# Patient Record
Sex: FEMALE | Race: BLACK OR AFRICAN AMERICAN | Employment: FULL TIME | URBAN - METROPOLITAN AREA
[De-identification: names, ages, dates, MRNs, and addresses within clinical notes are randomized per-mention and may not be internally consistent; named-entity substitution may affect disease eponyms.]

---

## 2017-07-01 ENCOUNTER — HOSPITAL ENCOUNTER (EMERGENCY)
Facility: CLINIC | Age: 22
Discharge: HOME | End: 2017-07-01
Attending: EMERGENCY MEDICINE | Admitting: EMERGENCY MEDICINE

## 2017-07-01 VITALS
WEIGHT: 170.19 LBS | RESPIRATION RATE: 16 BRPM | BODY MASS INDEX: 23.05 KG/M2 | DIASTOLIC BLOOD PRESSURE: 70 MMHG | HEIGHT: 72 IN | OXYGEN SATURATION: 100 % | TEMPERATURE: 98 F | SYSTOLIC BLOOD PRESSURE: 140 MMHG

## 2017-07-01 DIAGNOSIS — H53.9 VISION CHANGES: ICD-10-CM

## 2017-07-01 PROCEDURE — 99282 EMERGENCY DEPT VISIT SF MDM: CPT | Performed by: EMERGENCY MEDICINE

## 2017-07-01 PROCEDURE — 99282 EMERGENCY DEPT VISIT SF MDM: CPT | Mod: Z6 | Performed by: EMERGENCY MEDICINE

## 2017-07-01 RX ORDER — TETRACAINE HYDROCHLORIDE 5 MG/ML
SOLUTION OPHTHALMIC
Status: DISCONTINUED
Start: 2017-07-01 | End: 2017-07-02 | Stop reason: HOSPADM

## 2017-07-01 ASSESSMENT — ENCOUNTER SYMPTOMS
EYE PAIN: 0
EYE ITCHING: 1
PHOTOPHOBIA: 1
HEADACHES: 1

## 2017-07-01 ASSESSMENT — VISUAL ACUITY
OD: 20/15
OS: LESS THAN 20/400

## 2017-07-01 NOTE — ED AVS SNAPSHOT
[facility], [place], Emergency Department    [address] [address]    Phone:  [phone]                                       Paul Loera   MRN: [id number]    Department:  [facility], Emergency Department   Date of Visit:  7/1/2017           After Visit Summary Signature Page     I have received my discharge instructions, and my questions have been answered. I have discussed any challenges I see with this plan with the nurse or doctor.    ..........................................................................................................................................  Patient/Patient Representative Signature      ..........................................................................................................................................  Patient Representative Print Name and Relationship to Patient    ..................................................               ................................................  Date                                            Time    ..........................................................................................................................................  Reviewed by Signature/Title    ...................................................              ..............................................  Date                                                            Time

## 2017-07-01 NOTE — ED AVS SNAPSHOT
[facility] [place], Emergency Department    [address] [address]    Phone:  [phone]                                       Paul Loera   MRN: [id number]    Department:  [facility], Emergency Department   Date of Visit:  7/1/2017           Patient Information     Date Of Birth          1995        Your diagnoses for this visit were:     Vision changes        You were seen by James Capone MD.        Discharge Instructions       Please make an appointment to follow up with Eye Clinic (phone: [phone]) as soon as possible.      24 Hour Appointment Hotline       To make an appointment at any [place] clinic, call [phone]). If you don't have a family doctor or clinic, we will help you find one. [place] clinics are conveniently located to serve the needs of you and your family.             Review of your medicines      Our records show that you are taking the medicines listed below. If these are incorrect, please call your family doctor or clinic.        Dose / Directions Last dose taken    acetaminophen 325 MG tablet   Commonly known as:  TYLENOL   Dose:  650 mg   Quantity:  100 tablet        Take 2 tablets (650 mg) by mouth every 4 hours as needed for other (mild pain)   Refills:  0                Orders Needing Specimen Collection     None      Pending Results     No orders found from 6/29/2017 to 7/2/2017.            Pending Culture Results     No orders found from 6/29/2017 to 7/2/2017.            Pending Results Instructions     If you had any lab results that were not finalized at the time of your Discharge, you can call the ED Lab Result RN at [phone]. You will be contacted by this team for any positive Lab results or changes in treatment. The nurses are available 7 days a week from 10A to 6:30P.  You can leave a message 24 hours per day and they will return your call.        Thank you for choosing [facility]       Thank you for choosing  [facility] for your care. Our goal is always to provide you with excellent care. Hearing back from our patients is one way we can continue to improve our services. Please take a few minutes to complete the written survey that you may receive in the mail after you visit with us. Thank you!        [employer] Information     [employer] gives you secure access to your electronic health record. If you see a primary care provider, you can also send messages to your care team and make appointments. If you have questions, please call your primary care clinic.  If you do not have a primary care provider, please call [phone] and they will assist you.        Care EveryWhere ID     This is your Care EveryWhere ID. This could be used by other organizations to access your [facility] medical records  [id number]        Equal Access to Services     DUANE DE LA ROSA : Verona Lozano, tc elkins, brandy hinojosa, bartolome john. So [facility] [phone].    ATENCIÓN: Si habla español, tiene a yanez disposición servicios gratuitos de asistencia lingüística. Llame al [phone].    We comply with applicable federal civil rights laws and [place] laws. We do not discriminate on the basis of race, color, national origin, age, disability sex, sexual orientation or gender identity.            After Visit Summary       This is your record. Keep this with you and show to your community pharmacist(s) and doctor(s) at your next visit.

## 2017-07-02 ENCOUNTER — NURSE TRIAGE (OUTPATIENT)
Dept: NURSING | Facility: CLINIC | Age: 22
End: 2017-07-02
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
## 2017-07-02 NOTE — TELEPHONE ENCOUNTER
Reason for Call: Patient was seen in ER yesterday for Change in vision and is requesting an appointment with an eye specialist today. States vision is worse today. Declined triage. Patient will go to an ER, but not certain which one at this time.       Patient Recommendations/Teaching:Call back if any further questions or concerns.     Routed to:Not routed.     Jacki Gutiérrez RN [facility]
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
## 2017-07-02 NOTE — DISCHARGE INSTRUCTIONS
Please make an appointment to follow up with Eye Clinic (phone: [phone]) as soon as possible.

## 2017-07-14 ENCOUNTER — ANESTHESIA EVENT (OUTPATIENT)
Dept: SURGERY | Facility: CLINIC | Age: 22
End: 2017-07-14

## 2017-07-17 ENCOUNTER — OFFICE VISIT (OUTPATIENT)
Dept: SURGERY | Facility: CLINIC | Age: 22
End: 2017-07-17

## 2017-07-17 ENCOUNTER — APPOINTMENT (OUTPATIENT)
Dept: SURGERY | Facility: CLINIC | Age: 22
End: 2017-07-17

## 2017-07-17 VITALS
BODY MASS INDEX: 23.45 KG/M2 | HEIGHT: 72 IN | WEIGHT: 173.1 LBS | TEMPERATURE: 98.1 F | DIASTOLIC BLOOD PRESSURE: 78 MMHG | OXYGEN SATURATION: 99 % | SYSTOLIC BLOOD PRESSURE: 121 MMHG | RESPIRATION RATE: 14 BRPM | HEART RATE: 63 BPM

## 2017-07-17 DIAGNOSIS — Z01.818 PRE-OP EVALUATION: Primary | ICD-10-CM

## 2017-07-17 RX ORDER — OXYCODONE HYDROCHLORIDE 5 MG/1
5 TABLET ORAL 4 TIMES DAILY PRN
COMMUNITY
Start: 2017-07-02 | End: 2018-06-19

## 2017-07-17 RX ORDER — ALBUTEROL SULFATE 90 UG/1
2 AEROSOL, METERED RESPIRATORY (INHALATION) PRN
COMMUNITY
Start: 2016-03-15 | End: 2018-09-24
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
## 2017-07-17 NOTE — ANESTHESIA PREPROCEDURE EVALUATION
PAC Discussion and Assessment    ASA Classification: 1  Case is suitable for:   Anesthetic techniques and relevant risks discussed: GA  Invasive monitoring and risk discussed: No  Types:   Possibility and Risk of blood transfusion discussed: No  NPO instructions given:   Additional anesthetic preparation and risks discussed:   Needs early admission to pre-op area:   Other:     PAC Resident/NP Anesthesia Assessment:        Mid-Level Provider/Resident:   Date:   Time:     Attending Anesthesiologist Anesthesia Assessment:        Anesthesiologist:   Date:   Time:   Pass/Fail:   Disposition:     PAC Pharmacist Assessment:        Pharmacist:   Date:   Time:                           .

## 2017-12-31 ENCOUNTER — HEALTH MAINTENANCE LETTER (OUTPATIENT)
Age: 22
End: 2017-12-31

## 2018-02-19 ENCOUNTER — TRANSFERRED RECORDS (OUTPATIENT)
Dept: HEALTH INFORMATION MANAGEMENT | Facility: CLINIC | Age: 23
End: 2018-02-19

## 2018-06-11 ENCOUNTER — TRANSFERRED RECORDS (OUTPATIENT)
Dept: HEALTH INFORMATION MANAGEMENT | Facility: CLINIC | Age: 23
End: 2018-06-11

## 2018-06-19 ENCOUNTER — OFFICE VISIT (OUTPATIENT)
Dept: SURGERY | Facility: CLINIC | Age: 23
End: 2018-06-19
Payer: COMMERCIAL

## 2018-06-19 ENCOUNTER — ANESTHESIA EVENT (OUTPATIENT)
Dept: SURGERY | Facility: CLINIC | Age: 23
End: 2018-06-19

## 2018-06-19 ENCOUNTER — APPOINTMENT (OUTPATIENT)
Dept: SURGERY | Facility: CLINIC | Age: 23
End: 2018-06-19
Payer: COMMERCIAL

## 2018-06-19 VITALS
HEIGHT: 72 IN | WEIGHT: 165.2 LBS | SYSTOLIC BLOOD PRESSURE: 112 MMHG | OXYGEN SATURATION: 100 % | RESPIRATION RATE: 12 BRPM | HEART RATE: 64 BPM | TEMPERATURE: 98.3 F | DIASTOLIC BLOOD PRESSURE: 71 MMHG | BODY MASS INDEX: 22.37 KG/M2

## 2018-06-19 DIAGNOSIS — Z01.818 PRE-OPERATIVE GENERAL PHYSICAL EXAMINATION: Primary | ICD-10-CM
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
## 2018-09-14 ENCOUNTER — TELEPHONE (OUTPATIENT)
Dept: ORTHOPEDICS | Facility: CLINIC | Age: 23
End: 2018-09-14

## 2018-09-14 ENCOUNTER — OFFICE VISIT (OUTPATIENT)
Dept: ORTHOPEDICS | Facility: CLINIC | Age: 23
End: 2018-09-14
Payer: COMMERCIAL

## 2018-09-14 VITALS — WEIGHT: 159 LBS | HEIGHT: 72 IN | BODY MASS INDEX: 21.54 KG/M2

## 2018-09-14 DIAGNOSIS — L03.114 CELLULITIS OF LEFT UPPER EXTREMITY: Primary | ICD-10-CM

## 2018-09-14 DIAGNOSIS — D48.19 GIANT CELL TUMOR OF TENDON SHEATH: ICD-10-CM

## 2018-09-14 RX ORDER — KETOROLAC TROMETHAMINE 5 MG/ML
1 SOLUTION OPHTHALMIC 4 TIMES DAILY
COMMUNITY
End: 2018-09-24

## 2018-09-14 RX ORDER — PREDNISOLONE ACETATE 10 MG/ML
1-2 SUSPENSION/ DROPS OPHTHALMIC 4 TIMES DAILY
COMMUNITY
End: 2018-09-24

## 2018-09-14 ASSESSMENT — ENCOUNTER SYMPTOMS
HYPERTENSION: 0
EYE PAIN: 1
PALPITATIONS: 0
BRUISES/BLEEDS EASILY: 0
LIGHT-HEADEDNESS: 0
LEG PAIN: 1
SYNCOPE: 0
EXERCISE INTOLERANCE: 0
SLEEP DISTURBANCES DUE TO BREATHING: 0
HYPOTENSION: 0
ORTHOPNEA: 0
SWOLLEN GLANDS: 0

## 2018-09-14 NOTE — MR AVS SNAPSHOT
After Visit Summary   9/14/2018    Paul Loera    MRN: [id number]           Patient Information     Date Of Birth          1995        Visit Information        Provider Department      9/14/2018 11:15 AM Pankaj Guevara [facility]        Today's Diagnoses     Cellulitis of left upper extremity    -  1    Giant cell tumor of tendon sheath           Follow-ups after your visit        Future tests that were ordered for you today     Open Future Orders        Priority Expected Expires Ordered    MR Hand Left w/o & w Contrast Routine  9/14/2019 9/14/2018            Who to contact     Please call your clinic at [phone] to:    Ask questions about your health    Make or cancel appointments    Discuss your medicines    Learn about your test results    Speak to your doctor            Additional Information About Your Visit        [facility] Information     [employer] gives you secure access to your electronic health record. If you see a primary care provider, you can also send messages to your care team and make appointments. If you have questions, please call your primary care clinic.  If you do not have a primary care provider, please call [phone] and they will assist you.      [employer] is an electronic gateway that provides easy, online access to your medical records. With [employer], you can request a clinic appointment, read your test results, renew a prescription or communicate with your care team.     To access your existing account, please contact your [facility] or call [phone] for assistance.        Care EveryWhere ID     This is your Care EveryWhere ID. This could be used by other organizations to access your [place] medical records  [id number]        Your Vitals Were     Height BMI (Body Mass Index)                1.829 m (6') 21.56 kg/m2           Blood Pressure from Last 3 Encounters:   06/19/18 112/71   07/17/17 121/78    07/01/17 140/70    Weight from Last 3 Encounters:   09/14/18 72.1 kg (159 lb)   06/19/18 74.9 kg (165 lb 3.2 oz)   07/17/17 78.5 kg (173 lb 1.6 oz)               Primary Care Provider Fax #    Physician No Ref-Primary [phone]       No address on file        Equal Access to Services     [facility] : Hadii aad ku hadasho Soomaali, waaxda luqadaha, qaybta kaalmada adeegyada, waxay idiin hayaan adelatonya burger laeverette . So [facility] [phone].    ATENCIÓN: Si habla español, tiene a yanez disposición servicios gratuitos de asistencia lingüística. Llame al [phone].    We comply with applicable federal civil rights laws and [place] laws. We do not discriminate on the basis of race, color, national origin, age, disability, sex, sexual orientation, or gender identity.            Thank you!     Thank you for [facility]  for your care. Our goal is always to provide you with excellent care. Hearing back from our patients is one way we can continue to improve our services. Please take a few minutes to complete the written survey that you may receive in the mail after your visit with us. Thank you!             Your Updated Medication List - Protect others around you: Learn how to safely use, store and throw away your medicines at www.disposemymeds.org.          This list is accurate as of 9/14/18 11:31 AM.  Always use your most recent med list.                   Brand Name Dispense Instructions for use Diagnosis    albuterol 108 (90 Base) MCG/ACT inhaler    PROAIR HFA/PROVENTIL HFA/VENTOLIN HFA     Inhale 2 puffs into the lungs as needed (exercise)        CEPHALEXIN PO      Take 500 mg by mouth        ketorolac 0.5 % ophthalmic solution    ACULAR     1 drop 4 times daily        prednisoLONE acetate 1 % ophthalmic susp    PRED FORTE     1-2 drops 4 times daily

## 2018-09-14 NOTE — TELEPHONE ENCOUNTER
RN called patient's insurance [facility].  RN spoke with Davie YANG And Case # [id number], no  Prior authorization required for this imaging.

## 2018-09-14 NOTE — LETTER
9/14/2018       RE: Paul Loera  [address]  [place] [facility] [address]     Dear Colleague,    Thank you for referring your patient, Paul Loera, [facility]. Please see a copy of my visit note below.    I was present with the PA during the history and exam.  I discussed the case with the PA and agree with the findings as documented in the assessment and plan.    Chief Complaint: left hand swelling and pain    HPI: Paul is a 23-year-old female who is here for follow-up of her left wrist.  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 Guevara.  She has done well with it over the last 2 years.  However, couple months ago, she noticed 2 bumps in the area of the incision that were getting larger.  They were becoming somewhat painful.  She is concerned about a recurrence.  Then, 8 days ago, she noticed increased swelling and pain in that area as well as swelling and redness into the hand.  She denies any trauma or injury.  She denies any wound or illness.  She denies any fever or chills.  She was seen in the emergency department 4 days ago and x-rays were taken, which were negative.  She was placed on Keflex 500 mg 4 times a day.  She has been taking those.  She notes minimal improvement in her pain and swelling, but some improvement in the redness.  She is also having numbness and tingling in the area of her left thumb and first finger.  He has some pain and weakness with extension of her thumb.  She otherwise is feeling fine with no other stiff, sore or swollen joints.  She is unsure what to take for pain.  She had recent eye surgery in July and is not sure if she is allowed to take ibuprofen or aspirin products.  She will be seeing them later today to let them know what is going on with her hand and see what she can take.    Physical Exam: Paul is an athletic 23-year-old  female who is alert and oriented and in mild distress.  The left hand is obviously swollen over the dorsum and into the fingers.  She has swelling and tenderness diffusely throughout as well as at the site of the previous incision on the radial side of the left wrist.  There is minimal erythema today.  There is +1 edema.  She is describing some decreased sensation to the left thumb and index finger.  She is able to actively flex and extend, but it is limited by pain and swelling.  There are 2 small palpable nodules at either end of her incision.  There are no skin lesions or wounds.  No sign of abiodun abscess.    Imaging: X-rays taken of the left wrist 4 days ago in the emergency department were normal.    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  Will help to schedule that within the next day or 2 and then call the patient with the results and plan of care.  In the meantime, she should use ice, elevation, and I gave her an Ace wrap for compression.  She should check with her eye doctor and see if she can take ibuprofen to help with some of the pain and swelling.  She should finish out all of her antibiotics.  We will talk to her soon with the plan.  All questions answered.  Patient was also examined by Dr. Guevara and he agrees with the plan of care.           Again, thank you for allowing me to participate in the care of your patient.      Sincerely,    Pankaj Guevaar MD

## 2018-09-14 NOTE — PROGRESS NOTES
Chief Complaint: left hand swelling and pain    HPI: Paul is a 23-year-old female who is here for follow-up of her left wrist.  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 Guevara.  She has done well with it over the last 2 years.  However, couple months ago, she noticed 2 bumps in the area of the incision that were getting larger.  They were becoming somewhat painful.  She is concerned about a recurrence.  Then, 8 days ago, she noticed increased swelling and pain in that area as well as swelling and redness into the hand.  She denies any trauma or injury.  She denies any wound or illness.  She denies any fever or chills.  She was seen in the emergency department 4 days ago and x-rays were taken, which were negative.  She was placed on Keflex 500 mg 4 times a day.  She has been taking those.  She notes minimal improvement in her pain and swelling, but some improvement in the redness.  She is also having numbness and tingling in the area of her left thumb and first finger.  He has some pain and weakness with extension of her thumb.  She otherwise is feeling fine with no other stiff, sore or swollen joints.  She is unsure what to take for pain.  She had recent eye surgery in July and is not sure if she is allowed to take ibuprofen or aspirin products.  She will be seeing them later today to let them know what is going on with her hand and see what she can take.    Physical Exam: Paul is an athletic 23-year-old female who is alert and oriented and in mild distress.  The left hand is obviously swollen over the dorsum and into the fingers.  She has swelling and tenderness diffusely throughout as well as at the site of the previous incision on the radial side of the left wrist.  There is minimal erythema today.  There is +1 edema.  She is describing some decreased sensation to the left thumb and index finger.  She is able to actively flex and extend, but it is  limited by pain and swelling.  There are 2 small palpable nodules at either end of her incision.  There are no skin lesions or wounds.  No sign of abiodun abscess.    Imaging: X-rays taken of the left wrist 4 days ago in the emergency department were normal.    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  Will help to schedule that within the next day or 2 and then call the patient with the results and plan of care.  In the meantime, she should use ice, elevation, and I gave her an Ace wrap for compression.  She should check with her eye doctor and see if she can take ibuprofen to help with some of the pain and swelling.  She should finish out all of her antibiotics.  We will talk to her soon with the plan.  All questions answered.  Patient was also examined by Dr. Guevara and he agrees with the plan of care.

## 2018-09-14 NOTE — NURSING NOTE
Reason For Visit:   Chief Complaint   Patient presents with     Left Wrist - Mass     New mass on left wrist that was noticed 8 days ago       Ht 1.829 m (6')  Wt 72.1 kg (159 lb)  BMI 21.56 kg/m2    Pain Assessment  Patient Currently in Pain: Yes  0-10 Pain Scale: 8  Primary Pain Location: Wrist  Pain Orientation: Left  Pain Descriptors: Pressure  Alleviating Factors: Rest  Aggravating Factors: Movement ()    DOS 10/27/16 left wrist mass excision    Deon Clark, ATC

## 2018-09-17 ENCOUNTER — RADIANT APPOINTMENT (OUTPATIENT)
Dept: MRI IMAGING | Facility: CLINIC | Age: 23
End: 2018-09-17
Attending: PHYSICIAN ASSISTANT
Payer: COMMERCIAL

## 2018-09-17 DIAGNOSIS — D48.19 GIANT CELL TUMOR OF TENDON SHEATH: ICD-10-CM

## 2018-09-17 DIAGNOSIS — L03.114 CELLULITIS OF LEFT UPPER EXTREMITY: ICD-10-CM

## 2018-09-17 RX ORDER — GADOBUTROL 604.72 MG/ML
7.5 INJECTION INTRAVENOUS ONCE
Status: COMPLETED | OUTPATIENT
Start: 2018-09-17 | End: 2018-09-17

## 2018-09-17 RX ADMIN — GADOBUTROL 7.5 ML: 604.72 INJECTION INTRAVENOUS at 11:56

## 2018-09-19 ENCOUNTER — TELEPHONE (OUTPATIENT)
Dept: ORTHOPEDICS | Facility: CLINIC | Age: 23
End: 2018-09-19

## 2018-09-19 NOTE — TELEPHONE ENCOUNTER
[facility] Call Center    Phone Message    May a detailed message be left on voicemail: yes    Reason for Call: Other: pt calling to get some information on the surgery, recovery, when he can go back to work. Pt also wants to know about the diagnosis about the surgery. Pt would like to know what is going on. Please call pt back to discuss.     Action Taken: Message routed to:  [facility] orthopedics

## 2018-09-19 NOTE — TELEPHONE ENCOUNTER
RN called and spoke with Paul.  Dr. Guevara has reviewed her MRI of the left hand done on  09-17-18.  It does show that she has 2 nodules along the tendon sheath with some inflammation.  He would like to take them out.  Out pt surgery for  1 hour at [facility].

## 2018-09-19 NOTE — TELEPHONE ENCOUNTER
[facility] Call Center    Phone Message    May a detailed message be left on voicemail: yes    Reason for Call: Other: Pt waiting to discuss MRI results, please call     Action Taken: Message routed to:  [facility] Ortho Clinic

## 2018-09-19 NOTE — TELEPHONE ENCOUNTER
RN called and left voice message that OR will be on  09-27-18 at  [facility].  I will e-mail her surgery instructions, call if you have any questions.  You will need to have a pre-op physical.  Please call and arrange.  Forms included in e-mail.

## 2018-09-20 ENCOUNTER — DOCUMENTATION ONLY (OUTPATIENT)
Dept: ORTHOPEDICS | Facility: CLINIC | Age: 23
End: 2018-09-20

## 2018-09-20 NOTE — PROGRESS NOTES
Patient is scheduled for surgery with Dr. Guevara    Spoke or left message with: Alaina Paul RN    Date of Surgery: 9/27/18    Location: [facility]    Post op: 2 weeks, scheduled     Pre-op with surgeon (if applicable): Complete    H&P: Patient to schedule     Additional imaging/appointments: N/A    Surgery packet: Mailed to patient by RN    Additional comments: N/A

## 2018-09-20 NOTE — TELEPHONE ENCOUNTER
Patient had multiple questions regarding MRI findings, upcoming surgery and recovery.  She also would like a work note with restrictions on the day of surgery.  We should send her home with a sling, too.  I explained the findings and the plan of care and all questions were answered.  WE will send her a surgical packet. She is going to call [facility] for a pre-op H&P.

## 2018-09-24 ENCOUNTER — ANESTHESIA EVENT (OUTPATIENT)
Dept: SURGERY | Facility: AMBULATORY SURGERY CENTER | Age: 23
End: 2018-09-24

## 2018-09-24 ENCOUNTER — OFFICE VISIT (OUTPATIENT)
Dept: SURGERY | Facility: CLINIC | Age: 23
End: 2018-09-24
Payer: COMMERCIAL

## 2018-09-24 VITALS
SYSTOLIC BLOOD PRESSURE: 123 MMHG | TEMPERATURE: 97.5 F | HEART RATE: 71 BPM | WEIGHT: 161.3 LBS | DIASTOLIC BLOOD PRESSURE: 82 MMHG | OXYGEN SATURATION: 100 % | BODY MASS INDEX: 21.85 KG/M2 | RESPIRATION RATE: 16 BRPM | HEIGHT: 72 IN

## 2018-09-24 DIAGNOSIS — Z01.818 PREOPERATIVE GENERAL PHYSICAL EXAMINATION: Primary | ICD-10-CM

## 2018-09-24 RX ORDER — QUINIDINE GLUCONATE 324 MG
240 TABLET, EXTENDED RELEASE ORAL EVERY EVENING
COMMUNITY

## 2018-09-24 NOTE — H&P
Pre-Operative H & P     CC:  Preoperative exam to assess for increased cardiopulmonary risk while undergoing surgery and anesthesia.    Date of Encounter: 9/24/2018  Primary Care Physician:  No Ref-Primary, Physician    HPI  Paul Loera is a 23 year old female who presents for pre-operative H & P in preparation for excision of left wrist mass, with Dr. Guevara, on 9/27/18, at [facility]. She had a prior surgery on this wrist. She presented to ER ([facility]) 9/9/18 with new swelling and MRI showed recurrent or residual giant cell tumor of the tendon with tenosynovitis.  sheath. Rim-enhancing area dorsal to the first extensor findings may  represent  infectious or hemorrhagic etiology. HGB was 9.7 (She has stable chronic iron deficiency anemia from heavy menses). Pt was put on an antibiotic.  Swelling is down.    History is obtained from the patient.     Past Medical History  Past Medical History:   Diagnosis Date     Anemia      Ankle impingement syndrome, left      Central loss of vision, left      PONV (postoperative nausea and vomiting)      Tenosynovitis     left achilles     Uncomplicated asthma        Past Surgical History  Past Surgical History:   Procedure Laterality Date     ARTHROSCOPY HIP, CORRECT IMPINGEMENT FEMOROACETABULAR, REPAIR TEAR LABRAL, COMBINED Left 2010     C PELVIS/HIP JOINT SURGERY UNLISTED       C SHOULDER SURG PROC UNLISTED       EXCISE MASS WRIST Left 10/27/2016    Procedure: EXCISE MASS WRIST;  Surgeon: Pankaj Guevara MD;  Location: [facility]     GALLBLADDER SURGERY  2014     LENGTHEN TENDON ACHILLES Left 9/23/2015    Procedure: LENGTHEN TENDON ACHILLES;  Surgeon: Chandana Bunch MD;  [facility]     OPEN PARTIAL TALUS EXCISION Left 3/16/2016    MD Julio C     SHOULDER SURGERY Right 2011, 2012, 2013       Hx of Blood transfusions/reactions: no     Hx of abnormal bleeding or anti-platelet use: no    Menstrual history: No LMP recorded.:  9/21/18    Steroid use in the last year: no    Personal or FH with difficulty with Anesthesia:  PONV    Prior to Admission Medications  Current Outpatient Prescriptions   Medication Sig Dispense Refill     CEPHALEXIN PO Take 500 mg by mouth 2 times daily        Ferrous Gluconate 240 (27 Fe) MG TABS Take 240 mg by mouth daily (with lunch)         Allergies  No Known Allergies    Social History  Social History     Social History     Marital status: Single       Social History Main Topics     Smoking status: Never Smoker     Smokeless tobacco: Never Used     Alcohol use Yes      Comment: occasional 2-3 vodkas occassionaly     Drug use: Yes     Special: Marijuana      Comment: on occassions     Sexual activity: Yes     Partners: Female     Social History Narrative    Single.  Just graduated from [employer] 2017  Used to play basketball.     No children.    8 siblings (blended family)    No family history of bleeding, clotting disorders or complications with anesthesia.           Family History  Family History   Problem Relation Age of Onset     Other Cancer Paternal Grandmother          ROS/MED HX    ENT/Pulmonary:     (+)Intermittent asthma Last exacerbation: distant past ,Treatment: Inhaler prn,  , . .    Neurologic:  - neg neurologic ROS     Cardiovascular:  - neg cardiovascular ROS   (+) ----. : . . . :. . No previous cardiac testing       METS/Exercise Tolerance:  >4 METS   Hematologic:     (+) Anemia, -      Musculoskeletal:   (+) , , other musculoskeletal- wrist tumor; hip, shoulder and ankle problems      GI/Hepatic:  - neg GI/hepatic ROS       Renal/Genitourinary:  - ROS Renal section negative       Endo:  - neg endo ROS       Psychiatric: Comment: PTSD - stable    (+) psychiatric history other (comment)      Infectious Disease:  - neg infectious disease ROS       Malignancy:      - no malignancy   Other:    (+) no H/O Chronic Pain,no other significant disability          The complete review of systems is negative  "other than noted in the HPI or here.   Temp: 97.5  F (36.4  C) Temp src: Oral BP: 123/82 Pulse: 71   Resp: 16 SpO2: 100 %         161 lbs 4.8 oz  6' 0\"   Body mass index is 21.88 kg/(m^2).       Physical Exam  Constitutional: Awake, alert, cooperative, no apparent distress, and appears stated age.  Eyes: Pupils equal, round and reactive to light, extra ocular muscles intact, sclera clear, conjunctiva normal.  HENT: Normocephalic, oral pharynx with moist mucus membranes, good dentition. No goiter appreciated.   Respiratory: Clear to auscultation bilaterally, no crackles or wheezing.  Cardiovascular: Regular rate and rhythm, normal S1 and S2, and no murmur noted.  Carotids +2, no bruits. No LE edema. Palpable pulses to radial  arteries.   GI: Normal bowel sounds, soft, non-distended, non-tender, no masses palpated, no hepatosplenomegaly.  Surgical scars: left wrist. Multiople tatoos on arms and trunk.  Lymph/Hematologic: No cervical lymphadenopathy and no supraclavicular lymphadenopathy.  Genitourinary:  deferred  Skin: Warm and dry.  No rashes at anticipated surgical site.   Musculoskeletal: Full ROM of neck. Gross motor strength is not tested.  Neurologic: Awake, alert, oriented to name, place and time. Cranial nerves II-XII are grossly intact. Gait is normal.   Neuropsychiatric: Calm, cooperative. Normal affect.     Labs: (personally reviewed):  HGB 9.7 on 9/9/18 (Care Everywhere)  MRI:  9/17/18  1.) Tenosynovitis involving the first extensor compartment extending  from the level of the proximal radius to the proximal metacarpals  which may be reactive or infectious.   2. Heterogenous enhancing soft tissue signal involving surrounding the  first extensor compartment. Given the provided clinical history, this  likely represents recurrent or residual giant cell tumor of the tendon  sheath.   3. Rim-enhancing area dorsal to the first extensor compartment at the  level of the carpus. Based on signal characteristics on " T1 and  T2-weighted imaging this may represent densely proteinaceous fluid or  hemorrhage. Consider infectious or hemorrhagic etiology.     Outside records reviewed from [facility] for hemoglobin    ASSESSMENT and PLAN  Paul Loera is a 23 year old female scheduled to undergo excision of left wrist mass, with Dr. Guevara, on 9/27/18.     Pre-operative considerations:  1.) CV: Functional status independent and exercise tolerance > 4METS. No identifiable cardiac risks.   RCRI = 0.4%  No further cardiac evaluation indicated  2.) Pulmonary: Mild intermittent asthma triggered by exercise. Inhaler use prn-last 1 year ago.  CRISSY risk is small   3.) Heme: Hx of heavy periods and anemia. LMP ended 9/21/18. Last HGB 9/9/18 was 9/7 and patient directed to continue iron supplementation.  -Recommend pt f/u with primary for anemia. Continue iron supplementation.  4.) GI: PONV score = 3 ( 2 or> antiemetic prophylaxis recommended.     Previous anesthesia for multiple joint procedures: Nausea.     Patient was discussed with Dr Katz. Patient is optimized and is acceptable candidate for the proposed procedure.  No further diagnostic evaluation is needed.     MARCELINA Eason  Preoperative Assessment Center  [facility]  [facility]  Phone: [phone]  Fax: [phone]

## 2018-09-24 NOTE — MR AVS SNAPSHOT
After Visit Summary   2018    Paul Loera    MRN: [id number]           Patient Information     Date Of Birth          1995        Visit Information        Provider Department      2018 8:30 AM Kita Fuller PA M [facility]        Care Instructions    Preparing for Your Surgery      Name:  Paul Loera   MRN:  [id number]   :  1995   Today's Date:  2018     Arriving for surgery: Left Wrist Excision of mass   Surgery date:  2018  Arrival time:  7:45 am  Please come to:     [facility]  [address] [place] [id number]     Parking is available in front of the Clinics and Surgery Center building from 5:30AM to 8:00PM.  -  Proceed to the 5th floor to check into the Ambulatory Surgery Center.              >> There will be patient concierges on the 1st and 5th floor, for assistance or an escort, if you would like.              >> Please call [phone] with any questions.    What can I eat or drink?  -  You may have solid food or milk products until 8 hours prior to your surgery.( Midnight)   -  You may have water, apple juice or 7up/Sprite until 2 hours prior to your surgery. (until 7 am )    Which medicines can I take?        Stop Aspirin, vitamins and supplements one week prior to surgery.      Hold Ibuprofen and Naproxen for 24 hours prior to surgery.       -  Please take these medications the day of surgery:      Cephalexin       How do I prepare myself?  -  Take two showers: one the night before surgery; and one the morning of surgery.         Use Scrubcare or Hibiclens to wash from neck down.  You may use your own shampoo and conditioner. No other hair products.   -  Do NOT use lotion, powder, deodorant, or antiperspirant the day of your surgery.  -  Do NOT wear any makeup, fingernail polish or jewelry  - Do not bring your own medications to the hospital, except for inhalers and  eye drops.  -  Bring your ID and insurance card.    Questions or Concerns:    -If you are scheduled at the Ambulatory Surgery Center please call [phone].    -For questions after surgery please call your surgeons office.                     Follow-ups after your visit        Your next 10 appointments already scheduled     Sep 27, 2018   Procedure with Pankaj Guevara MD   [facility] ([facility])    [address] [place] [address]  5th [facility] [address] [phone]           Located in the Clinics and Surgery Center at [address] [place] [address].   parking is very convenient and highly recommended.  is a $6 flat rate fee.  Both  and self parkers should enter the main arrival plaza from [facility]; parking attendants will direct you based on your parking preference.            Oct 10, 2018  9:30 AM CDT   (Arrive by 9:15 AM)   Return Visit with Pankaj Guevara MD   [facility] ([facility])    [address] [place] [address]  4th [facility] [address] [phone]              Who to contact     Please call your clinic at [phone] to:    Ask questions about your health    Make or cancel appointments    Discuss your medicines    Learn about your test results    Speak to your doctor            Additional Information About Your Visit        [employer] Information     [employer] gives you secure access to your electronic health record. If you see a primary care provider, you can also send messages to your care team and make appointments. If you have questions, please call your primary care clinic.  If you do not have a primary care provider, please call [phone] and they will assist you.      [employer] is an electronic gateway that provides easy, online access to your medical records. With [employer], you can request a clinic appointment, read your test results, renew a  prescription or communicate with your care team.     To access your existing account, please contact your [facility] or call [phone] for assistance.        Care EveryWhere ID     This is your Care EveryWhere ID. This could be used by other organizations to access your [facility] medical records  [id number]        Your Vitals Were     Pulse Temperature Respirations Height Pulse Oximetry BMI (Body Mass Index)    71 97.5  F (36.4  C) (Oral) 16 1.829 m (6') 100% 21.88 kg/m2       Blood Pressure from Last 3 Encounters:   09/24/18 123/82   06/19/18 112/71   07/17/17 121/78    Weight from Last 3 Encounters:   09/24/18 73.2 kg (161 lb 4.8 oz)   09/14/18 72.1 kg (159 lb)   06/19/18 74.9 kg (165 lb 3.2 oz)              Today, you had the following     No orders found for display       Primary Care Provider Fax #    Physician No Ref-Primary [phone]       No address on file        Equal Access to Services     DUANE DE LA ROSA : Hadii aad ku hadasho Soomaali, waaxda luqadaha, qaybta kaalmada adeegyaoni, bartolome reyna . So [facility] [phone].    ATENCIÓN: Si habla español, tiene a yanez disposición servicios gratuitos de asistencia lingüística. Llame al [phone].    We comply with applicable federal civil rights laws and [place] laws. We do not discriminate on the basis of race, color, national origin, age, disability, sex, sexual orientation, or gender identity.            Thank you!     Thank you for choosing [facility]  for your care. Our goal is always to provide you with excellent care. Hearing back from our patients is one way we can continue to improve our services. Please take a few minutes to complete the written survey that you may receive in the mail after your visit with us. Thank you!             Your Updated Medication List - Protect others around you: Learn how to safely use, store and throw away your medicines at  www.disposemymeds.org.          This list is accurate as of 9/24/18  8:51 AM.  Always use your most recent med list.                   Brand Name Dispense Instructions for use Diagnosis    CEPHALEXIN PO      Take 500 mg by mouth 2 times daily        Ferrous Gluconate 240 (27 Fe) MG Tabs      Take 240 mg by mouth daily (with lunch)

## 2018-09-24 NOTE — ANESTHESIA PREPROCEDURE EVALUATION
Anesthesia Evaluation     . Pt has had prior anesthetic.     History of anesthetic complications   - PONV        ROS/MED HX    ENT/Pulmonary:     (+)Intermittent asthma Last exacerbation: distant past ,Treatment: Inhaler prn,  , . .    Neurologic:  - neg neurologic ROS     Cardiovascular:  - neg cardiovascular ROS   (+) ----. : . . . :. . No previous cardiac testing       METS/Exercise Tolerance:  >4 METS   Hematologic:     (+) Anemia, -      Musculoskeletal:   (+) , , other musculoskeletal- wrist tumor; hip, shoulder and ankle problems      GI/Hepatic:  - neg GI/hepatic ROS       Renal/Genitourinary:  - ROS Renal section negative       Endo:  - neg endo ROS       Psychiatric: Comment: PTSD - stable    (+) psychiatric history other (comment)      Infectious Disease:  - neg infectious disease ROS       Malignancy:      - no malignancy   Other:    (+) no H/O Chronic Pain,no other significant disability                    Physical Exam  Normal systems: dental    Airway   Mallampati: II  TM distance: >3 FB  Neck ROM: full    Dental     Cardiovascular   Rhythm and rate: regular and normal      Pulmonary    breath sounds clear to auscultation    Other findings: LABS:  Lab Results      Component                Value               Date                      WBC                      5.0                 10/25/2016            Lab Results      Component                Value               Date                      RBC                      4.00                10/25/2016            Lab Results      Component                Value               Date                      HGB                      11.1                10/25/2016            Lab Results      Component                Value               Date                      HCT                      34.3                10/25/2016            No components found for: MCT  Lab Results      Component                Value               Date                      MCV                      86                   10/25/2016            Lab Results      Component                Value               Date                      MCH                      27.8                10/25/2016            Lab Results      Component                Value               Date                      MCHC                     32.4                10/25/2016            Lab Results      Component                Value               Date                      RDW                      12.8                10/25/2016            Lab Results      Component                Value               Date                      PLT                      247                 10/25/2016              From outside lab: 9/9/18: HGB 9.7             PAC Discussion and Assessment    ASA Classification: 1  Case is suitable for:   Anesthetic techniques and relevant risks discussed: GA  Invasive monitoring and risk discussed: No  Types:   Possibility and Risk of blood transfusion discussed: No  NPO instructions given:   Additional anesthetic preparation and risks discussed:   Needs early admission to pre-op area:   Other:     PAC Resident/NP Anesthesia Assessment:  23 year old female for excision of left wrist mass, with Dr. Pankaj Guevara, at [facility], in treatment of benign tumor left wrist with infection. She is currently on antibiotic. PAC referral for risk assessment and optimization for anesthesia.      Pre-operative considerations:  1.) CV: Functional status independent and exercise tolerance > 4METS. No identifiable cardiac risks.   RCRI = 0.4%  No further cardiac evaluation indicated  2.) Pulmonary: Mild intermittent asthma triggered by exercise. Inhaler use prn-last 1 year ago.  CRISSY risk is small   3.) Heme: Hx of heavy periods and anemia. LMP ended 9/21/18. Last HGB 9/9/18 was 9/7 and patient directed to continue iron supplementation.  -Recommend pt f/u with primary for anemia.  4.) GI: PONV score = 3 ( 2 or> antiemetic prophylaxis recommended.    Previous  anesthesia for multiple joint procedures: Nausea.   Pt also seen by Dr. Katz. See her recommendations below.         Reviewed and Signed by PAC Mid-Level Provider/Resident  Mid-Level Provider/Resident: Kita Fuller PA-C  Date: 9/24/18  Time: 9:19 AM    Attending Anesthesiologist Anesthesia Assessment:  23 year old for excision of left wrist mass; prior infection, appears to have cleared completely. Athlete; no cardiac or pulmonary disease other than mild intermittent asthma.     Unfortunately she has both PONV and issues with constipation (opioids will exacerbate both) - we discussed various peripheral blocks, and I usggested that, if she is sent home on opioids, to request a prescription for zofran as well.    Chart reviewed, patient seen and evaluated; agree with above assessment.    Patient is appropriate for the planned procedure without further workup or medical management change. The final anesthesia plan will be determined by the physician anesthesiologist caring for the patient on the day of surgery.      Reviewed and Signed by PAC Anesthesiologist  Anesthesiologist: nicole  Date: 9/24/2018  Time:   Pass/Fail: Pass  Disposition:     PAC Pharmacist Assessment:        Pharmacist:   Date:   Time:      Anesthesia Plan      History & Physical Review  History and physical reviewed and following examination; no interval change.    ASA Status:  1 .    NPO Status:  > 8 hours    Plan for General and Periph. Nerve Block for postop pain (potential post-op block) with Intravenous induction. Maintenance will be Inhalation.    PONV prophylaxis:  Ondansetron (or other 5HT-3) and Dexamethasone or Solumedrol       Postoperative Care  Postoperative pain management:  IV analgesics and Peripheral nerve block (Single Shot).      Consents  Anesthetic plan, risks, benefits and alternatives discussed with:  Patient..            Procedure: Procedure(s):  Excision Left Wrist Masses - Wound Class: I-Clean    HPI: Paul  Evaristo is a 23 year old female scheduled for L wrist lesion resection.  PMH otherwise unremarkable. Has had anesthesia in the past with peripheral nerve blocks.  Pt states she does not like taking PO narcotics secondary to side effect of constipation.  Patient is consented for a potential post-op block depending on level of pain.  Plan for general, LMA, standard ASA monitors, single IV.    PMHx/PSHx:  Past Medical History:   Diagnosis Date     Anemia      Ankle impingement syndrome, left      Central loss of vision, left      PONV (postoperative nausea and vomiting)      Tenosynovitis     left achilles     Uncomplicated asthma        Past Surgical History:   Procedure Laterality Date     ARTHROSCOPY HIP, CORRECT IMPINGEMENT FEMOROACETABULAR, REPAIR TEAR LABRAL, COMBINED Left 2010     C PELVIS/HIP JOINT SURGERY UNLISTED       C SHOULDER SURG PROC UNLISTED       C STOMACH SURGERY PROCEDURE UNLISTED       EXCISE MASS WRIST Left 10/27/2016    Procedure: EXCISE MASS WRIST;  Surgeon: Pankaj Guevara MD;  Location: [facility]     GALLBLADDER SURGERY  2014     LENGTHEN TENDON ACHILLES Left 9/23/2015    Procedure: LENGTHEN TENDON ACHILLES;  Surgeon: Chandana Bunch MD;  Location: [facility]     OPEN PARTIAL TALUS EXCISION Left 3/16/2016    MD Julio C     SHOULDER SURGERY Right 2011, 2012, 2013         Current Outpatient Prescriptions on File Prior to Encounter:  CEPHALEXIN PO Take 500 mg by mouth 2 times daily      No current facility-administered medications on file prior to encounter.     Social Hx:   Social History   Substance Use Topics     Smoking status: Never Smoker     Smokeless tobacco: Never Used     Alcohol use Yes      Comment: occasional        Allergies: No Known Allergies      NPO Status: Per ASA Guidelines    Labs:    Blood Bank:  No results found for: ABO, RH, AS  BMP:  Recent Labs   Lab Test  12/29/16   1237   NA  143   POTASSIUM  4.0   CHLORIDE  109   CO2  27   BUN  9   CR  0.80   GLC  90   SHILPA   8.3*     CBC:   Recent Labs   Lab Test  10/25/16   1610   WBC  5.0   RBC  4.00   HGB  11.1*   HCT  34.3*   MCV  86   MCH  27.8   MCHC  32.4   RDW  12.8   PLT  247     Coags:  No results for input(s): INR, PTT, FIBR in the last 10162 hours.    Clifford Granados MD  Staff Anesthesiologist  [phone]

## 2018-09-27 ENCOUNTER — ANESTHESIA (OUTPATIENT)
Dept: SURGERY | Facility: AMBULATORY SURGERY CENTER | Age: 23
End: 2018-09-27

## 2018-09-27 ENCOUNTER — SURGERY (OUTPATIENT)
Age: 23
End: 2018-09-27

## 2018-09-27 ENCOUNTER — HOSPITAL ENCOUNTER (OUTPATIENT)
Facility: AMBULATORY SURGERY CENTER | Age: 23
End: 2018-09-27
Attending: ORTHOPAEDIC SURGERY
Payer: COMMERCIAL

## 2018-09-27 VITALS
RESPIRATION RATE: 16 BRPM | HEIGHT: 72 IN | HEART RATE: 64 BPM | WEIGHT: 162 LBS | BODY MASS INDEX: 21.94 KG/M2 | TEMPERATURE: 97.5 F | DIASTOLIC BLOOD PRESSURE: 96 MMHG | SYSTOLIC BLOOD PRESSURE: 145 MMHG | OXYGEN SATURATION: 98 %

## 2018-09-27 DIAGNOSIS — D48.19 GIANT CELL TUMOR OF TENDON SHEATH: Primary | ICD-10-CM

## 2018-09-27 LAB
HCG UR QL: NEGATIVE
INTERNAL QC OK POCT: YES

## 2018-09-27 RX ORDER — BUPIVACAINE HYDROCHLORIDE 2.5 MG/ML
INJECTION, SOLUTION INFILTRATION; PERINEURAL PRN
Status: DISCONTINUED | OUTPATIENT
Start: 2018-09-27 | End: 2018-09-27 | Stop reason: HOSPADM

## 2018-09-27 RX ORDER — KETOROLAC TROMETHAMINE 30 MG/ML
30 INJECTION, SOLUTION INTRAMUSCULAR; INTRAVENOUS EVERY 6 HOURS PRN
Status: DISCONTINUED | OUTPATIENT
Start: 2018-09-27 | End: 2018-09-28 | Stop reason: HOSPADM

## 2018-09-27 RX ORDER — ONDANSETRON 4 MG/1
4 TABLET, ORALLY DISINTEGRATING ORAL EVERY 30 MIN PRN
Status: DISCONTINUED | OUTPATIENT
Start: 2018-09-27 | End: 2018-09-28 | Stop reason: HOSPADM

## 2018-09-27 RX ORDER — SODIUM CHLORIDE, SODIUM LACTATE, POTASSIUM CHLORIDE, CALCIUM CHLORIDE 600; 310; 30; 20 MG/100ML; MG/100ML; MG/100ML; MG/100ML
INJECTION, SOLUTION INTRAVENOUS CONTINUOUS
Status: DISCONTINUED | OUTPATIENT
Start: 2018-09-27 | End: 2018-09-27 | Stop reason: HOSPADM

## 2018-09-27 RX ORDER — ONDANSETRON 2 MG/ML
INJECTION INTRAMUSCULAR; INTRAVENOUS PRN
Status: DISCONTINUED | OUTPATIENT
Start: 2018-09-27 | End: 2018-09-27

## 2018-09-27 RX ORDER — NALOXONE HYDROCHLORIDE 0.4 MG/ML
.1-.4 INJECTION, SOLUTION INTRAMUSCULAR; INTRAVENOUS; SUBCUTANEOUS
Status: DISCONTINUED | OUTPATIENT
Start: 2018-09-27 | End: 2018-09-28 | Stop reason: HOSPADM

## 2018-09-27 RX ORDER — OXYCODONE HYDROCHLORIDE 5 MG/1
10 TABLET ORAL
Status: DISCONTINUED | OUTPATIENT
Start: 2018-09-27 | End: 2018-09-28 | Stop reason: HOSPADM

## 2018-09-27 RX ORDER — LIDOCAINE 40 MG/G
CREAM TOPICAL
Status: DISCONTINUED | OUTPATIENT
Start: 2018-09-27 | End: 2018-09-27 | Stop reason: HOSPADM

## 2018-09-27 RX ORDER — OXYCODONE HYDROCHLORIDE 5 MG/1
5 TABLET ORAL EVERY 4 HOURS PRN
Status: DISCONTINUED | OUTPATIENT
Start: 2018-09-27 | End: 2018-09-28 | Stop reason: HOSPADM

## 2018-09-27 RX ORDER — ACETAMINOPHEN 325 MG/1
650 TABLET ORAL
Status: DISCONTINUED | OUTPATIENT
Start: 2018-09-27 | End: 2018-09-28 | Stop reason: HOSPADM

## 2018-09-27 RX ORDER — AMOXICILLIN 250 MG
1-2 CAPSULE ORAL 2 TIMES DAILY PRN
Qty: 30 TABLET | Refills: 1 | Status: SHIPPED | OUTPATIENT
Start: 2018-09-27

## 2018-09-27 RX ORDER — LIDOCAINE HYDROCHLORIDE 20 MG/ML
INJECTION, SOLUTION INFILTRATION; PERINEURAL PRN
Status: DISCONTINUED | OUTPATIENT
Start: 2018-09-27 | End: 2018-09-27

## 2018-09-27 RX ORDER — FENTANYL CITRATE 50 UG/ML
25-50 INJECTION, SOLUTION INTRAMUSCULAR; INTRAVENOUS
Status: DISCONTINUED | OUTPATIENT
Start: 2018-09-27 | End: 2018-09-27 | Stop reason: HOSPADM

## 2018-09-27 RX ORDER — SODIUM CHLORIDE, SODIUM LACTATE, POTASSIUM CHLORIDE, CALCIUM CHLORIDE 600; 310; 30; 20 MG/100ML; MG/100ML; MG/100ML; MG/100ML
INJECTION, SOLUTION INTRAVENOUS CONTINUOUS
Status: DISCONTINUED | OUTPATIENT
Start: 2018-09-27 | End: 2018-09-28 | Stop reason: HOSPADM

## 2018-09-27 RX ORDER — ACETAMINOPHEN 325 MG/1
650 TABLET ORAL EVERY 4 HOURS PRN
Qty: 60 TABLET | Refills: 1 | Status: SHIPPED | OUTPATIENT
Start: 2018-09-27

## 2018-09-27 RX ORDER — ACETAMINOPHEN 325 MG/1
975 TABLET ORAL ONCE
Status: COMPLETED | OUTPATIENT
Start: 2018-09-27 | End: 2018-09-27

## 2018-09-27 RX ORDER — PROPOFOL 10 MG/ML
INJECTION, EMULSION INTRAVENOUS CONTINUOUS PRN
Status: DISCONTINUED | OUTPATIENT
Start: 2018-09-27 | End: 2018-09-27

## 2018-09-27 RX ORDER — GABAPENTIN 300 MG/1
300 CAPSULE ORAL ONCE
Status: COMPLETED | OUTPATIENT
Start: 2018-09-27 | End: 2018-09-27

## 2018-09-27 RX ORDER — OXYCODONE HYDROCHLORIDE 5 MG/1
5-10 TABLET ORAL EVERY 4 HOURS PRN
Qty: 10 TABLET | Refills: 0 | Status: SHIPPED | OUTPATIENT
Start: 2018-09-27 | End: 2022-01-27

## 2018-09-27 RX ORDER — ONDANSETRON 2 MG/ML
4 INJECTION INTRAMUSCULAR; INTRAVENOUS EVERY 30 MIN PRN
Status: DISCONTINUED | OUTPATIENT
Start: 2018-09-27 | End: 2018-09-28 | Stop reason: HOSPADM

## 2018-09-27 RX ORDER — PROPOFOL 10 MG/ML
INJECTION, EMULSION INTRAVENOUS PRN
Status: DISCONTINUED | OUTPATIENT
Start: 2018-09-27 | End: 2018-09-27

## 2018-09-27 RX ORDER — DEXAMETHASONE SODIUM PHOSPHATE 4 MG/ML
INJECTION, SOLUTION INTRA-ARTICULAR; INTRALESIONAL; INTRAMUSCULAR; INTRAVENOUS; SOFT TISSUE PRN
Status: DISCONTINUED | OUTPATIENT
Start: 2018-09-27 | End: 2018-09-27

## 2018-09-27 RX ORDER — KETOROLAC TROMETHAMINE 30 MG/ML
INJECTION, SOLUTION INTRAMUSCULAR; INTRAVENOUS PRN
Status: DISCONTINUED | OUTPATIENT
Start: 2018-09-27 | End: 2018-09-27

## 2018-09-27 RX ORDER — IBUPROFEN 600 MG/1
600 TABLET, FILM COATED ORAL EVERY 6 HOURS PRN
Qty: 40 TABLET | Refills: 0 | Status: SHIPPED | OUTPATIENT
Start: 2018-09-27

## 2018-09-27 RX ORDER — MEPERIDINE HYDROCHLORIDE 25 MG/ML
12.5 INJECTION INTRAMUSCULAR; INTRAVENOUS; SUBCUTANEOUS
Status: DISCONTINUED | OUTPATIENT
Start: 2018-09-27 | End: 2018-09-28 | Stop reason: HOSPADM

## 2018-09-27 RX ORDER — FENTANYL CITRATE 50 UG/ML
INJECTION, SOLUTION INTRAMUSCULAR; INTRAVENOUS PRN
Status: DISCONTINUED | OUTPATIENT
Start: 2018-09-27 | End: 2018-09-27

## 2018-09-27 RX ADMIN — PROPOFOL 150 MG: 10 INJECTION, EMULSION INTRAVENOUS at 10:07

## 2018-09-27 RX ADMIN — SODIUM CHLORIDE, SODIUM LACTATE, POTASSIUM CHLORIDE, CALCIUM CHLORIDE: 600; 310; 30; 20 INJECTION, SOLUTION INTRAVENOUS at 09:06

## 2018-09-27 RX ADMIN — SODIUM CHLORIDE, SODIUM LACTATE, POTASSIUM CHLORIDE, CALCIUM CHLORIDE: 600; 310; 30; 20 INJECTION, SOLUTION INTRAVENOUS at 10:01

## 2018-09-27 RX ADMIN — ONDANSETRON 4 MG: 2 INJECTION INTRAMUSCULAR; INTRAVENOUS at 10:12

## 2018-09-27 RX ADMIN — DEXAMETHASONE SODIUM PHOSPHATE 4 MG: 4 INJECTION, SOLUTION INTRA-ARTICULAR; INTRALESIONAL; INTRAMUSCULAR; INTRAVENOUS; SOFT TISSUE at 10:12

## 2018-09-27 RX ADMIN — FENTANYL CITRATE 50 MCG: 50 INJECTION, SOLUTION INTRAMUSCULAR; INTRAVENOUS at 10:04

## 2018-09-27 RX ADMIN — GABAPENTIN 300 MG: 300 CAPSULE ORAL at 09:05

## 2018-09-27 RX ADMIN — BUPIVACAINE HYDROCHLORIDE 18 ML: 2.5 INJECTION, SOLUTION INFILTRATION; PERINEURAL at 10:50

## 2018-09-27 RX ADMIN — PROPOFOL 100 MCG/KG/MIN: 10 INJECTION, EMULSION INTRAVENOUS at 10:07

## 2018-09-27 RX ADMIN — OXYCODONE HYDROCHLORIDE 5 MG: 5 TABLET ORAL at 11:40

## 2018-09-27 RX ADMIN — KETOROLAC TROMETHAMINE 30 MG: 30 INJECTION, SOLUTION INTRAMUSCULAR; INTRAVENOUS at 10:51

## 2018-09-27 RX ADMIN — LIDOCAINE HYDROCHLORIDE 80 MG: 20 INJECTION, SOLUTION INFILTRATION; PERINEURAL at 10:07

## 2018-09-27 RX ADMIN — ACETAMINOPHEN 975 MG: 325 TABLET ORAL at 09:04

## 2018-09-27 RX ADMIN — FENTANYL CITRATE 50 MCG: 50 INJECTION, SOLUTION INTRAMUSCULAR; INTRAVENOUS at 10:56

## 2018-09-27 NOTE — DISCHARGE INSTRUCTIONS
"[facility]  Home Care Following Anesthesia  For 24 hours after surgery:  1. Get plenty of rest.  A responsible adult must stay with you for at least 24 hours after you leave the surgery center.  2. Do not drive or use heavy equipment.  If you have weakness or tingling, don't drive or use heavy equipment until this feeling goes away.   3. Do not drink alcohol.   4. Avoid strenuous or risky activities.  Ask for help when climbing stairs.  5. You may feel lightheaded.  IF so, sit for a few minutes before standing.  Have someone help you get up.   6. If you have nausea (feel sick to your stomach): Drink only clear liquids such as apple juice, ginger ale, broth or 7-Up.  Rest may also help.  Be sure to drink enough fluids.  Move to a regular diet as you feel able.   7. You may have a slight fever.  Call the doctor if your fever is over 100 F (37.7 C) (taken under the tongue) or lasts longer than 24 hours.  8. You may have a dry mouth, a sore throat, muscle aches or trouble sleeping. These should go away after 24 hours.  9. Do not make important or legal decisions.        Today you received a Marcaine or bupivacaine block to numb the nerves near your surgery site.  This is a block using local anesthetic or \"numbing\" medication injected around the nerves to anesthetize or \"numb\" the area supplied by those nerves.  This block is injected into the muscle layer near your surgical site.  The medication may numb the location where you had surgery for 6-18 hours, but may last up to 24 hours.  If your surgical site is an arm or leg you should be careful with your affected limb, since it is possible to injure your limb without being aware of it due to the numbing.  Until full feeling returns, you should guard against bumping or hitting your limb, and avoid extreme hot or cold temperatures on the skin.  As the block wears off, the feeling will return as a tingling or prickly sensation near your " surgical site.  You will experience more discomfort from your incision as the feeling returns.  You may want to take a pain pill (a narcotic or Tylenol if this was prescribed by your surgeon) when you start to experience mild pain before the pain beccomes more severe.  If your pain medications do not control your pain you should notifiy your surgeon.    Tips for taking pain medications  To get the best pain relief possible, remember these points:    Take pain medications as directed, before pain becomes severe.    Pain medication can upset your stomach: taking it with food may help.    Constipation is a common side effect of pain medication. Drink plenty of  fluids.    Eat foods high in fiber. Take a stool softener if recommended by your doctor or pharmacist.    Do not drink alcohol, drive or operate machinery while taking pain medications.    Ask about other ways to control pain, such as with heat, ice or relaxation.    Tylenol/Acetaminophen Consumption  To help encourage the safe use of acetaminophen, the makers of TYLENOL  have lowered the maximum daily dose for single-ingredient Extra Strength TYLENOL  (acetaminophen) products sold in the [place] from 8 pills per day (4,000 mg) to 6 pills per day (3,000 mg). The dosing interval has also changed from 2 pills every 4-6 hours to 2 pills every 6 hours.    If you feel your pain relief is insufficient, you may take Tylenol/Acetaminophen in addition to your narcotic pain medication.     Be careful not to exceed 3,000 mg of Tylenol/Acetaminophen in a 24 hour period from all sources.    If you are taking extra strength Tylenol/acetaminophen (500 mg), the maximum dose is 6 tablets in 24 hours.    If you are taking regular strength acetaminophen (325 mg), the maximum dose is 9 tablets in 24 hours.    Call a doctor for any of the followin. Signs of infection (fever, growing tenderness at the surgery site, a large amount of drainage or bleeding, severe pain, foul-smelling  drainage, redness, swelling).  2. It has been over 8 to 10 hours since surgery and you are still not able to urinate (pass water).  3. Headache for over 24 hours.  4. Numbness, tingling or weakness the day after surgery (if you had spinal anesthesia).  Your doctor is:       Dr. Pankaj Guevara, Orthopaedics: [phone]               Or dial [phone] and ask for the resident on call for:  Orthopaedics  For emergency care, call the:  [facility] Emergency Department: [phone] (TTY for hearing impaired: [phone])

## 2018-09-27 NOTE — ANESTHESIA POSTPROCEDURE EVALUATION
Patient: Paul Loera    Procedure(s):  Excision Left Wrist Masses - Wound Class: I-Clean    Diagnosis:Tumor  Diagnosis Additional Information: No value filed.    Anesthesia Type:  General    Note:  Anesthesia Post Evaluation    Patient location during evaluation: Bedside  Patient participation: Able to participate in evaluation but full recovery from regional anesthesia has not yet ocurrred but is anticipated to occur within 48 hours  Level of consciousness: awake and alert  Pain management: adequate  Airway patency: patent  Cardiovascular status: acceptable  Respiratory status: acceptable  Hydration status: acceptable  PONV: none             Last vitals:  Vitals:    09/27/18 1215 09/27/18 1230 09/27/18 1245   BP: 118/76 138/83 (!) 145/96   Pulse:      Resp: 17 16 16   Temp: 36.4  C (97.5  F)     SpO2: 100% 99% 98%         Electronically Signed By: Clifford Granados MD  September 27, 2018  1:09 PM

## 2018-09-27 NOTE — BRIEF OP NOTE
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   Post-operative diagnosis Same   Procedure: Procedure(s):  Excision Left Wrist Masses - Wound Class: I-Clean  Surgeon: Surgeon(s) and Role:     * Pankaj Guevara MD - Primary  Anesthesia: General   Estimated blood loss: Less than 10 ml  Drains: None  Specimens:   ID Type Source Tests Collected by Time Destination   A : Left wrist mass  Tissue Wrist, Left SURGICAL PATHOLOGY EXAM Pankaj Guevara MD 9/27/2018 10:26 AM      Findings:   tumors enveloping the tendon slips in the first dorsal compartment .  Complications: None.  Implants: None.      Post-Op Plan:  Activity: Up ad helio   Weight bearing status: No lifting with LUE, ok for ADLs   Diet: Advance diet as tolerated.   DVT prophylaxis: ambulation   Bracing/Splinting: none  Elevation: Elevate LUE on pillows.   Wound Care: Dressings may be removed on POD#7  Pain management: Orals as needed, wean as able.   Follow-up: Clinic with Dr. Guevara on 10/10  Disposition: Home    Fredy Romo MD   Orthopedic Surgery, PGY-4  Pager: [phone]

## 2018-09-27 NOTE — ANESTHESIA PROCEDURE NOTES
Peripheral Nerve Block Procedure Note    Staff:     Anesthesiologist:  ALONDRA CERVANTES  Location: PACU  Procedure Start/Stop TImes:      9/27/2018 12:05 PM     9/27/2018 12:16 PM    patient identified, IV checked, site marked, risks and benefits discussed, informed consent, monitors and equipment checked, pre-op evaluation, at physician/surgeon's request and post-op pain management      Correct Patient: Yes      Correct Position: Yes      Correct Site: Yes      Correct Procedure: Yes      Correct Laterality:  Yes    Site Marked:  Yes  Procedure details:     Procedure:  Brachial plexus    ASA:  1    Laterality:  Left    Position:  Supine    Sterile Prep: chloraprep, mask and sterile gloves      Local skin infiltration:  None    Insertion site: L axilla.    Needle:  Insulated    Needle gauge:  21    Needle length (inches):  2    Ultrasound: Yes      Ultrasound used to identify targeted nerve, plexus, or vascular structure and placed a needle adjacent to it      Permanent Image entered into patiient's record      Abnormal pain on injection: No      Blood Aspirated: No      Paresthesias:  No    Bleeding at site: No      Test dose negative for signs of intravascular injection: Yes      Bolus via:  Needle    Infusion Method:  Single Shot    Complications:  None  Assessment/Narrative:     Injection made incrementally with aspirations every (mL):  5     L axillary peripheral nerve block.  30mL 0.5% ropivacaine w 5mg dexamethasone, 1:600,000 epi injected.  Aspirations every 5mL and negative throughout.  Injected easily, patient tolerated procedure without incident.

## 2018-09-27 NOTE — IP AVS SNAPSHOT
MRN:[id number]                      After Visit Summary   9/27/2018    Paul Loera    MRN: [id number]           Thank you!     Thank you for choosing [facility] for your care. Our goal is always to provide you with excellent care. Hearing back from our patients is one way we can continue to improve our services. Please take a few minutes to complete the written survey that you may receive in the mail after you visit with us. Thank you!        Patient Information     Date Of Birth          1995        About your hospital stay     You were admitted on:  September 27, 2018 [facility]    You were discharged on:  September 27, 2018       Who to Call     For medical emergencies, please call 911.  For non-urgent questions about your medical care, please call your primary care provider or clinic, None  For questions related to your surgery, please call your surgery clinic        Attending Provider     Provider Pankaj Goldberg MD Orthopaedic Surgery       Primary Care Provider Fax #    Physician No Ref-Primary [phone]      After Care Instructions      Diet as Tolerated       Return to diet before surgery, unless instructed otherwise.            Discharge Instructions       Review outpatient procedure discharge instructions with patient as directed by Provider            Ice to affected area       Ice pack to surgical site every 15 minutes per hour for 24 hours            No weight bearing       Ok for WBAT with ADLs, but no lifting for two weeks.            Return to clinic       Return to clinic on 10/10 for wound check            Wound care       Remove dressing 7 days after surgery. Do not immerse wound in water for 3-4 weeks                  Your next 10 appointments already scheduled     Oct 10, 2018  9:30 AM CDT   (Arrive by 9:15 AM)   Return Visit with Pankaj Guevara MD   [facility]  "([facility])    [address]  [address] [place] [address] [phone]              Further instructions from your care team       [facility]  Home Care Following Anesthesia  For 24 hours after surgery:  1. Get plenty of rest.  A responsible adult must stay with you for at least 24 hours after you leave the surgery center.  2. Do not drive or use heavy equipment.  If you have weakness or tingling, don't drive or use heavy equipment until this feeling goes away.   3. Do not drink alcohol.   4. Avoid strenuous or risky activities.  Ask for help when climbing stairs.  5. You may feel lightheaded.  IF so, sit for a few minutes before standing.  Have someone help you get up.   6. If you have nausea (feel sick to your stomach): Drink only clear liquids such as apple juice, ginger ale, broth or 7-Up.  Rest may also help.  Be sure to drink enough fluids.  Move to a regular diet as you feel able.   7. You may have a slight fever.  Call the doctor if your fever is over 100 F (37.7 C) (taken under the tongue) or lasts longer than 24 hours.  8. You may have a dry mouth, a sore throat, muscle aches or trouble sleeping. These should go away after 24 hours.  9. Do not make important or legal decisions.        Today you received a Marcaine or bupivacaine block to numb the nerves near your surgery site.  This is a block using local anesthetic or \"numbing\" medication injected around the nerves to anesthetize or \"numb\" the area supplied by those nerves.  This block is injected into the muscle layer near your surgical site.  The medication may numb the location where you had surgery for 6-18 hours, but may last up to 24 hours.  If your surgical site is an arm or leg you should be careful with your affected limb, since it is possible to injure your limb without being aware of it due to the numbing.  Until full feeling returns, you should guard against " bumping or hitting your limb, and avoid extreme hot or cold temperatures on the skin.  As the block wears off, the feeling will return as a tingling or prickly sensation near your surgical site.  You will experience more discomfort from your incision as the feeling returns.  You may want to take a pain pill (a narcotic or Tylenol if this was prescribed by your surgeon) when you start to experience mild pain before the pain beccomes more severe.  If your pain medications do not control your pain you should notifiy your surgeon.    Tips for taking pain medications  To get the best pain relief possible, remember these points:    Take pain medications as directed, before pain becomes severe.    Pain medication can upset your stomach: taking it with food may help.    Constipation is a common side effect of pain medication. Drink plenty of  fluids.    Eat foods high in fiber. Take a stool softener if recommended by your doctor or pharmacist.    Do not drink alcohol, drive or operate machinery while taking pain medications.    Ask about other ways to control pain, such as with heat, ice or relaxation.    Tylenol/Acetaminophen Consumption  To help encourage the safe use of acetaminophen, the makers of TYLENOL  have lowered the maximum daily dose for single-ingredient Extra Strength TYLENOL  (acetaminophen) products sold in the [place] from 8 pills per day (4,000 mg) to 6 pills per day (3,000 mg). The dosing interval has also changed from 2 pills every 4-6 hours to 2 pills every 6 hours.    If you feel your pain relief is insufficient, you may take Tylenol/Acetaminophen in addition to your narcotic pain medication.     Be careful not to exceed 3,000 mg of Tylenol/Acetaminophen in a 24 hour period from all sources.    If you are taking extra strength Tylenol/acetaminophen (500 mg), the maximum dose is 6 tablets in 24 hours.    If you are taking regular strength acetaminophen (325 mg), the maximum dose is 9 tablets in 24  hours.    Call a doctor for any of the followin. Signs of infection (fever, growing tenderness at the surgery site, a large amount of drainage or bleeding, severe pain, foul-smelling drainage, redness, swelling).  2. It has been over 8 to 10 hours since surgery and you are still not able to urinate (pass water).  3. Headache for over 24 hours.  4. Numbness, tingling or weakness the day after surgery (if you had spinal anesthesia).  Your doctor is:       Dr. Pankaj Guevara, Orthopaedics: [phone]               Or dial [phone] and ask for the resident on call for:  Orthopaedics  For emergency care, call the:  [facility] Emergency Department: [phone] (TTY for hearing impaired: [phone])                  Pending Results     Date and Time Order Name Status Description    2018 1048 Surgical pathology exam In process             Admission Information     Date & Time Provider Department Dept. Phone    2018 Pankaj Guevara MD [facility] [phone]      Your Vitals Were     Blood Pressure Pulse Temperature Respirations Height Weight    118/76 64 97.5  F (36.4  C) (Temporal) 17 1.829 m (6') 73.5 kg (162 lb)    Last Period Pulse Oximetry BMI (Body Mass Index)             2018 100% 21.97 kg/m2         [employer] Information     [employer] gives you secure access to your electronic health record. If you see a primary care provider, you can also send messages to your care team and make appointments. If you have questions, please call your primary care clinic.  If you do not have a primary care provider, please call [phone] and they will assist you.      [employer] is an electronic gateway that provides easy, online access to your medical records. With [employer], you can request a clinic appointment, read your test results, renew a prescription or communicate with your care team.     To access your existing account, please contact your [facility] or call [phone] for assistance.        Care EveryWhere ID     This is your Care EveryWhere ID. This could be used by other organizations to access your [facility] medical records  [id number]        Equal Access to Services     DUANE DE LA ROSA : Hadii aad ku hadzeynepteodora Marta, waroseannda luqadaha, qaybta kaalmada micaela, bartolome almarazelieser bassettlatonya burger axel john. So [facility] [phone].    ATENCIÓN: Si habla español, tiene a yanez disposición servicios gratuitos de asistencia lingüística. Llame al [phone].    We comply with applicable federal civil rights laws and [place] laws. We do not discriminate on the basis of race, color, national origin, age, disability, sex, sexual orientation, or gender identity.               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        Dose:  600 mg   Take 1 tablet (600 mg) by mouth every 6 hours as needed for pain (mild)   Quantity:  40 tablet   Refills:  0       oxyCODONE IR 5 MG tablet   Commonly known as:  ROXICODONE   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   Quantity:  30 tablet   Refills:  1         CONTINUE these  medicines which have NOT CHANGED        Dose / Directions    CEPHALEXIN PO        Dose:  500 mg   Take 500 mg by mouth 2 times daily   Refills:  0       Ferrous Gluconate 240 (27 Fe) MG Tabs        Dose:  240 mg   Take 240 mg by mouth daily (with lunch)   Refills:  0            Where to get your medicines      These medications were sent to [place], [place] - [address]  [address], [place] [address]    Hours:  TRANSPLANT PHONE NUMBER [phone] Phone:  [phone]     acetaminophen 325 MG tablet    ibuprofen 600 MG tablet    senna-docusate 8.6-50 MG per tablet         Some of these will need a paper prescription and others can be bought over the counter. Ask your nurse if you have questions.     Bring a paper prescription for each of these medications     oxyCODONE IR 5 MG tablet                Protect others around you: Learn how to safely use, store and throw away your medicines at www.disposemymeds.org.        Information about OPIOIDS     PRESCRIPTION OPIOIDS: WHAT YOU NEED TO KNOW   We gave you an opioid (narcotic) pain medicine. It is important to manage your pain, but opioids are not always the best choice. You should first try all the other options your care team gave you. Take this medicine for as short a time (and as few doses) as possible.    Some activities can increase your pain, such as bandage changes or therapy sessions. It may help to take your pain medicine 30 to 60 minutes before these activities. Reduce your stress by getting enough sleep, working on hobbies you enjoy and practicing relaxation or meditation. Talk to your care team about ways to manage your pain beyond prescription opioids.    These medicines have risks:    DO NOT drive when on new or higher doses of pain medicine. These medicines can affect your alertness and reaction times, and you could be arrested for driving under the influence (DUI). If you need to  use opioids long-term, talk to your care team about driving.    DO NOT operate heavy machinery    DO NOT do any other dangerous activities while taking these medicines.    DO NOT drink any alcohol while taking these medicines.     If the opioid prescribed includes acetaminophen, DO NOT take with any other medicines that contain acetaminophen. Read all labels carefully. Look for the word  acetaminophen  or  Tylenol.  Ask your pharmacist if you have questions or are unsure.    You can get addicted to pain medicines, especially if you have a history of addiction (chemical, alcohol or substance dependence). Talk to your care team about ways to reduce this risk.    All opioids tend to cause constipation. Drink plenty of water and eat foods that have a lot of fiber, such as fruits, vegetables, prune juice, apple juice and high-fiber cereal. Take a laxative (Miralax, milk of magnesia, Colace, Senna) if you don t move your bowels at least every other day. Other side effects include upset stomach, sleepiness, dizziness, throwing up, tolerance (needing more of the medicine to have the same effect), physical dependence and slowed breathing.    Store your pills in a secure place, locked if possible. We will not replace any lost or stolen medicine. If you don t finish your medicine, please throw away (dispose) as directed by your pharmacist. The [place] Pollution [employer] has more information about safe disposal: https://www.pca.[facility].[place].us/living-green/managing-unwanted-medications             Medication List: This is a list of all your medications and when to take them. Check marks below indicate your daily home schedule. Keep this list as a reference.      Medications           Morning Afternoon Evening Bedtime As Needed    acetaminophen 325 MG tablet   Commonly known as:  TYLENOL   Take 2 tablets (650 mg) by mouth every 4 hours as needed for other (mild pain)   Last time this was given:  975 mg on 9/27/2018  9:04 AM                                 CEPHALEXIN PO   Take 500 mg by mouth 2 times daily                                Ferrous Gluconate 240 (27 Fe) MG Tabs   Take 240 mg by mouth daily (with lunch)                                ibuprofen 600 MG tablet   Commonly known as:  ADVIL/MOTRIN   Take 1 tablet (600 mg) by mouth every 6 hours as needed for pain (mild)                                oxyCODONE IR 5 MG tablet   Commonly known as:  ROXICODONE   Take 1-2 tablets (5-10 mg) by mouth every 4 hours as needed for pain or moderate to severe pain (Moderate to Severe)   Last time this was given:  5 mg on 9/27/2018 11:40 AM                                senna-docusate 8.6-50 MG per tablet   Commonly known as:  SENOKOT-S;PERICOLACE   Take 1-2 tablets by mouth 2 times daily as needed for constipation Take while on oral narcotics to prevent or treat constipation.

## 2018-09-27 NOTE — LETTER
Return to Work  2018     Seen today: yes    Patient:  Paul Loera  :   1995  MRN:     [id number]  Physician: Data Unavailable    Paul Loera may return to work but should not do any lifting for 2 weeks.       The next clinic appointment is scheduled for (date/time) 10/10.    Patient limitations:  No lifting with the left arm         Electronically signed by Pankaj Guevara MD.

## 2018-09-27 NOTE — PROGRESS NOTES
Patient received left side Axillary nerve block  without Exparel.  Tolerated procedure well.

## 2018-09-27 NOTE — ANESTHESIA CARE TRANSFER NOTE
Patient: Paul Loera    Procedure(s):  Excision Left Wrist Masses - Wound Class: I-Clean    Diagnosis: Tumor  Diagnosis Additional Information: No value filed.    Anesthesia Type:   General     Note:  Airway :Room Air  Patient transferred to:PACU  Comments: Patient awake and breathing spont. VSS. No complaints of pain or nausea. Report to RnHandoff Report: Identifed the Patient, Identified the Reponsible Provider, Reviewed the pertinent medical history, Discussed the surgical course, Reviewed Intra-OP anesthesia mangement and issues during anesthesia, Set expectations for post-procedure period and Allowed opportunity for questions and acknowledgement of understanding      Vitals: (Last set prior to Anesthesia Care Transfer)    CRNA VITALS  9/27/2018 1053 - 9/27/2018 1128      9/27/2018             Pulse: 65    SpO2: 100 %    Resp Rate (observed): (!)  44    Resp Rate (set): 10                Electronically Signed By: HOANG Pichardo CRNA  September 27, 2018  11:28 AM

## 2018-09-27 NOTE — OP NOTE
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    SURGEON: Pankaj Guevara MD     ASSISTANT: Carolina HEWITT, Fredy Romo fourth year resident    PATIENT HISTORY: This patient had a previous surgery to remove a mass from the radial aspect of her left wrist.  She has had recurrent inflammation there and an MRI revealed a mass.  Some of the inflammation has subsided but she still has a uncomfortable mass at the distal and proximal end of her previous incision.  She understands the risks of bleeding pain infection numbness tingling stiffness of the wrist and recurrence.    DESCRIPTION OF PROCEDURE: Patient was placed in supine position after anesthesia and the left arm was washed and sterilely prepped and draped.  I excised her scar with a 15 blade and then developed the subcutaneous plane with cautery and then later with scissors.  I identified a sensory nerve branch that was scarred and reflected that.  Tumor was readily identified associated with the tendons in the deep aspect of the wound.  The tumor involved the synovium and the lining of the tendons.  I was able to strip off the synovium and looked on the deep side of the tendon and circumferentially to remove small patches of this tumor.  I looked at the proximal end of the field leaving some of the retinaculum intact and identified another mass.  At that point I did another synovectomy of that section of the tendon which required circumferential inspection of the tendon and removal of synovium from the medial lateral superficial and deep sides.  Once all the palpable and visible tumor had been removed we deflated the tourniquet and copiously irrigated.  I cauterized numerous small bleeding vessels.  We closed with 2-0 Vicryl in the subcutaneous tissue  Monocryl in the skin Steri-Strips a sterile dry dressing and an elastic wrap.  The patient was extubated and taken to the recovery room in stable condition.  There were no complications.  The estimated blood loss is 10 mL.  I was present for all critical portions of the procedure.    Pankaj Guevara MD

## 2018-09-27 NOTE — IP AVS SNAPSHOT
[facility]    [address] [place] [address]    Phone:  [phone]    Fax:  [phone]                                       After Visit Summary   9/27/2018    Paul Loera    MRN: [id number]           After Visit Summary Signature Page     I have received my discharge instructions, and my questions have been answered. I have discussed any challenges I see with this plan with the nurse or doctor.    ..........................................................................................................................................  Patient/Patient Representative Signature      ..........................................................................................................................................  Patient Representative Print Name and Relationship to Patient    ..................................................               ................................................  Date                                   Time    ..........................................................................................................................................  Reviewed by Signature/Title    ...................................................              ..............................................  Date                                               Time          22EPIC Rev 08/18

## 2018-10-01 LAB — COPATH REPORT: NORMAL

## 2018-10-18 ENCOUNTER — TELEPHONE (OUTPATIENT)
Dept: ORTHOPEDICS | Facility: CLINIC | Age: 23
End: 2018-10-18

## 2018-10-18 NOTE — TELEPHONE ENCOUNTER
Phoned patient to assist with scheduling her post op with Dr. Guevara. Left patient my direct number to call back when she can. [phone].

## 2018-10-24 ENCOUNTER — OFFICE VISIT (OUTPATIENT)
Dept: ORTHOPEDICS | Facility: CLINIC | Age: 23
End: 2018-10-24
Payer: COMMERCIAL

## 2018-10-24 VITALS — WEIGHT: 162 LBS | BODY MASS INDEX: 21.94 KG/M2 | HEIGHT: 72 IN

## 2018-10-24 DIAGNOSIS — D48.19 GIANT CELL TUMOR OF TENDON SHEATH: Primary | ICD-10-CM

## 2018-10-24 NOTE — NURSING NOTE
Reason For Visit:   Chief Complaint   Patient presents with     Left Wrist - RECHECK       Ht 1.829 m (6')  Wt 73.5 kg (162 lb)  BMI 21.97 kg/m2    Pain Assessment  Patient Currently in Pain: Yes  0-10 Pain Scale: 4  Primary Pain Location: Wrist  Pain Orientation: Left  Pain Descriptors: Numbness, Sore  Aggravating Factors:  (Grabbing)    Deon Clark, ATC

## 2018-10-24 NOTE — PROGRESS NOTES
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  Excision of tumor left wrist 2015  2.  Excision of tumor recurrence left wrist 2018    History of Present Illness:   Paul Loera is a 23 year old female status-post the above who presents for postoperative evaluation.  Patient has been recovering as expected.  She continues to have some pain in the wrist and throughout the hand area.  She has a patch of numbness on the dorsal aspect of the hand in the first webspace.  She had trouble getting back to day-to-day activities secondary to pain and stiffness in the hand and wrist.  No fever or chills.  Her incision healed without issues.    Physical Examination:  Ht 1.829 m (6')  Wt 73.5 kg (162 lb)  BMI 21.97 kg/m2  Her incision is healed with a slight keloid formation.  She has an area of numbness in the radial sensory branch over the dorsal aspect of her first webspace.  She has 5 out of 5 strength in the radial and median nerve.  Sensation of the median nerve is intact.  No erythema or drainage from incision.    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    Plan:   We discussed with the patient that her postoperative course is to be expected and that she had extensive excision and exposure.  Her sensation changes likely from stretch the radial sensory nerve intraoperatively.  Expect this to recover over the course the next few months.  Given her stiffness and soreness with motion we recommended hand therapy for.  As she is unsure if she wants to proceed with this and we have given her a hard copy prescription which she can take anywhere she would like for hand therapy.  We will plan to see her as needed.    Fredy  MD Clarissa   Orthopedic Surgery; PGY-4    This patient was seen, examined and counseled by Dr. Guevara.

## 2018-10-24 NOTE — MR AVS SNAPSHOT
After Visit Summary   10/24/2018    Paul Loera    MRN: [id number]           Patient Information     Date Of Birth          1995        Visit Information        Provider Department      10/24/2018 9:45 AM Pankaj Guevara [facility]        Today's Diagnoses     Giant cell tumor of tendon sheath    -  1       Follow-ups after your visit        Additional Services     HAND THERAPY Occupational Therapy or Physical Therapy       Hand Therapy Referral                  Future tests that were ordered for you today     Open Future Orders        Priority Expected Expires Ordered    HAND THERAPY Occupational Therapy or Physical Therapy Routine  10/24/2019 10/24/2018            Who to contact     Please call your clinic at [phone] to:    Ask questions about your health    Make or cancel appointments    Discuss your medicines    Learn about your test results    Speak to your doctor            Additional Information About Your Visit        MyChart Information     [facility] gives you secure access to your electronic health record. If you see a primary care provider, you can also send messages to your care team and make appointments. If you have questions, please call your primary care clinic.  If you do not have a primary care provider, please call [phone] and they will assist you.      [facility] is an electronic gateway that provides easy, online access to your medical records. With [facility], you can request a clinic appointment, read your test results, renew a prescription or communicate with your care team.     To access your existing account, please contact your [facility] or call [phone] for assistance.        Care EveryWhere ID     This is your Care EveryWhere ID. This could be used by other organizations to access your [place] medical records  [id number]        Your Vitals Were     Height BMI (Body Mass Index)                1.829  m (6') 21.97 kg/m2           Blood Pressure from Last 3 Encounters:   09/27/18 (!) 145/96   09/24/18 123/82   06/19/18 112/71    Weight from Last 3 Encounters:   10/24/18 73.5 kg (162 lb)   09/27/18 73.5 kg (162 lb)   09/24/18 73.2 kg (161 lb 4.8 oz)               Primary Care Provider Fax #    Physician No Ref-Primary [phone]       No address on file        Equal Access to Services     DUANE DE LA ROSA : Hadii jenni ku hadasho Soomaali, waaxda luqadaha, qaybta kaalmada adeegyada, bartolome roberts hayrazia reyna . So [facility] [phone].    ATENCIÓN: Si habla español, tiene a yanez disposición servicios gratuitos de asistencia lingüística. Llame al [phone].    We comply with applicable federal civil rights laws and [place] laws. We do not discriminate on the basis of race, color, national origin, age, disability, sex, sexual orientation, or gender identity.            Thank you!     Thank you for choosing [facility]  for your care. Our goal is always to provide you with excellent care. Hearing back from our patients is one way we can continue to improve our services. Please take a few minutes to complete the written survey that you may receive in the mail after your visit with us. Thank you!             Your Updated Medication List - Protect others around you: Learn how to safely use, store and throw away your medicines at www.disposemymeds.org.          This list is accurate as of 10/24/18 10:14 AM.  Always use your most recent med list.                   Brand Name Dispense Instructions for use Diagnosis    acetaminophen 325 MG tablet    TYLENOL    60 tablet    Take 2 tablets (650 mg) by mouth every 4 hours as needed for other (mild pain)    Giant cell tumor of tendon sheath       CEPHALEXIN PO      Take 500 mg by mouth 2 times daily        Ferrous Gluconate 240 (27 Fe) MG Tabs      Take 240 mg by mouth daily (with lunch)        ibuprofen 600 MG tablet    ADVIL/MOTRIN    40 tablet    Take  1 tablet (600 mg) by mouth every 6 hours as needed for pain (mild)    Giant cell tumor of tendon sheath       oxyCODONE IR 5 MG tablet    ROXICODONE    10 tablet    Take 1-2 tablets (5-10 mg) by mouth every 4 hours as needed for pain or moderate to severe pain (Moderate to Severe)    Giant cell tumor of tendon sheath       senna-docusate 8.6-50 MG per tablet    SENOKOT-S;PERICOLACE    30 tablet    Take 1-2 tablets by mouth 2 times daily as needed for constipation Take while on oral narcotics to prevent or treat constipation.    Giant cell tumor of tendon sheath

## 2018-10-24 NOTE — LETTER
10/24/2018       RE: Paul Loera  [address]  [facility] [address]     Dear Colleague,    Thank you for referring your patient, Paul Loera, [facility]. Please see a copy of my visit note below.    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  Excision of tumor left wrist 2015  2.  Excision of tumor recurrence left wrist 2018    History of Present Illness:   Paul Loera is a 23 year old female status-post the above who presents for postoperative evaluation.  Patient has been recovering as expected.  She continues to have some pain in the wrist and throughout the hand area.  She has a patch of numbness on the dorsal aspect of the hand in the first webspace.  She had trouble getting back to day-to-day activities secondary to pain and stiffness in the hand and wrist.  No fever or chills.  Her incision healed without issues.    Physical Examination:  Ht 1.829 m (6')  Wt 73.5 kg (162 lb)  BMI 21.97 kg/m2  Her incision is healed with a slight keloid formation.  She has an area of numbness in the radial sensory branch over the dorsal aspect of her first webspace.  She has 5 out of 5 strength in the radial and median nerve.  Sensation of the median nerve is intact.  No erythema or drainage from incision.    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    Plan:   We discussed with the patient that her postoperative course is to be expected and that she had extensive excision and exposure.  Her sensation changes likely from stretch the radial sensory nerve intraoperatively.  Expect this to recover over the course the next few  months.  Given her stiffness and soreness with motion we recommended hand therapy for.  As she is unsure if she wants to proceed with this and we have given her a hard copy prescription which she can take anywhere she would like for hand therapy.  We will plan to see her as needed.    Fredy Romo MD   Orthopedic Surgery; PGY-4    This patient was seen, examined and counseled by Dr. Guevara.      I was present with the resident during the history and exam.  I discussed the case with the resident and agree with the findings as documented in the assessment and plan.    Again, thank you for allowing me to participate in the care of your patient.      Sincerely,    Pankaj Guevara MD

## 2020-03-10 ENCOUNTER — HEALTH MAINTENANCE LETTER (OUTPATIENT)
Age: 25
End: 2020-03-10

## 2020-12-27 ENCOUNTER — HEALTH MAINTENANCE LETTER (OUTPATIENT)
Age: 25
End: 2020-12-27

## 2021-04-24 ENCOUNTER — HEALTH MAINTENANCE LETTER (OUTPATIENT)
Age: 26
End: 2021-04-24

## 2021-10-04 ENCOUNTER — HEALTH MAINTENANCE LETTER (OUTPATIENT)
Age: 26
End: 2021-10-04

## 2021-12-09 DIAGNOSIS — D48.19 GIANT CELL TUMOR OF TENDON SHEATH: Primary | ICD-10-CM

## 2021-12-13 ENCOUNTER — TELEPHONE (OUTPATIENT)
Dept: ORTHOPEDICS | Facility: CLINIC | Age: 26
End: 2021-12-13
Payer: COMMERCIAL

## 2021-12-13 NOTE — TELEPHONE ENCOUNTER
[facility] Call Center    Phone Message    May a detailed message be left on voicemail: yes     Reason for Call: Other: Patient is calling because she has an appt on wednesday, 12.15 but was told she needed to do the MRI before the next appt but she isnt scheduled for the MRI until 12.23 so she is wondering if she should still come in on Wednesday or post pone that appt.       Action Taken: Message routed to:  [facility] ([facility] ortho     Travel Screening: Not Applicable

## 2021-12-13 NOTE — TELEPHONE ENCOUNTER
[facility] called pt. She reported no pain. She'd like to wait after the MRI to see Dr. Guevara. Pt will call with questions or concerns.         -LALY De La Rosa- Orthopedics

## 2021-12-23 ENCOUNTER — OFFICE VISIT (OUTPATIENT)
Dept: ORTHOPEDICS | Facility: CLINIC | Age: 26
End: 2021-12-23
Payer: COMMERCIAL

## 2021-12-23 ENCOUNTER — HOSPITAL ENCOUNTER (OUTPATIENT)
Dept: MRI IMAGING | Facility: CLINIC | Age: 26
Discharge: HOME | End: 2021-12-23
Attending: PHYSICIAN ASSISTANT | Admitting: PHYSICIAN ASSISTANT
Payer: COMMERCIAL

## 2021-12-23 ENCOUNTER — TELEPHONE (OUTPATIENT)
Dept: ORTHOPEDICS | Facility: CLINIC | Age: 26
End: 2021-12-23

## 2021-12-23 VITALS — BODY MASS INDEX: 20.86 KG/M2 | WEIGHT: 154 LBS | HEIGHT: 72 IN

## 2021-12-23 DIAGNOSIS — D48.19 GIANT CELL TUMOR OF TENDON SHEATH: Primary | ICD-10-CM

## 2021-12-23 DIAGNOSIS — D48.19 GIANT CELL TUMOR OF TENDON SHEATH: ICD-10-CM

## 2021-12-23 PROCEDURE — 73223 MRI JOINT UPR EXTR W/O&W/DYE: CPT | Mod: LT

## 2021-12-23 PROCEDURE — 255N000002 HC RX 255 OP 636: Performed by: PHYSICIAN ASSISTANT

## 2021-12-23 PROCEDURE — A9585 GADOBUTROL INJECTION: HCPCS | Performed by: PHYSICIAN ASSISTANT

## 2021-12-23 PROCEDURE — 73223 MRI JOINT UPR EXTR W/O&W/DYE: CPT | Mod: 26 | Performed by: RADIOLOGY

## 2021-12-23 PROCEDURE — 99204 OFFICE O/P NEW MOD 45 MIN: CPT | Performed by: ORTHOPAEDIC SURGERY

## 2021-12-23 RX ORDER — GADOBUTROL 604.72 MG/ML
7 INJECTION INTRAVENOUS ONCE
Status: COMPLETED | OUTPATIENT
Start: 2021-12-23 | End: 2021-12-23

## 2021-12-23 RX ADMIN — GADOBUTROL 7 ML: 604.72 INJECTION INTRAVENOUS at 07:12

## 2021-12-23 ASSESSMENT — MIFFLIN-ST. JEOR: SCORE: 1540.05

## 2021-12-23 NOTE — LETTER
Date:December 26, 2021      Patient was self referred, no letter generated. Do not send.        [facility] Health Information

## 2021-12-23 NOTE — NURSING NOTE
"Reason For Visit:   Chief Complaint   Patient presents with     Consult     discuss left wrist mass // last seen 2018 // first noticed new mass in November        Ht 1.82 m (5' 11.65\")   Wt 69.9 kg (154 lb)   BMI 21.09 kg/m      Pain Assessment  Patient Currently in Pain: Yes  0-10 Pain Scale: 5 (can get up to 10)  Primary Pain Location: Wrist (left)    Neha Boggs [facility]    "

## 2021-12-23 NOTE — LETTER
12/23/2021         RE: Paul Loera  [address]  [facility] [facility] [address]        Dear Colleague,    Thank you for referring your patient, Paul Loera, to the [facility] [place]. Please see a copy of my visit note below.    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  Her last surgery was about 3 years ago.  She is developed a recurrent tumor.  She does lifting as part of her work and it is uncomfortable when lifting heavier things.    On examination she is alert oriented has a normal mood and affect and is in no acute distress.  In her left wrist there are 2 masses underneath her scar.  There is no swelling in the hand and she has normal motion of the hand and wrist and fingers.    I reviewed this patient's MRI and can see 2 masses around the tendons on the radial aspect of the wrist.  I am concerned that the surround these tendons.  The masses are consistent with a tenosynovial giant cell tumor.    I talked with the patient about different approaches.  These are very surgically resectable and so I would recommend a surgical approach again.  I do not think a medical treatment would be indicated for these.  The patient is concerned about further recurrence.  I think if we keep having recurrences from the tumor within the tendon then we would consider removal of the tendon reconstruction.  This would have to be done in coordination with hand surgery.  I would reserve this approach for recurrence after her next surgery.  I have answered all her questions.      Again, thank you for allowing me to participate in the care of your patient.        Sincerely,        Pankaj Guevara MD

## 2021-12-23 NOTE — NURSING NOTE
Teaching Flowsheet   Relevant Diagnosis: preop teaching  Teaching Topic: excision let wrist masses     Person(s) involved in teaching:   Patient     Motivation Level:  Asks Questions: Yes  Eager to Learn: Yes  Cooperative: Yes  Receptive (willing/able to accept information): Yes  Any cultural factors/Adventism beliefs that may influence understanding or compliance? No       Patient demonstrates understanding of the following:  Reason for the appointment, diagnosis and treatment plan: Yes  Knowledge of proper use of medications and conditions for which they are ordered (with special attention to potential side effects or drug interactions): Yes  Which situations necessitate calling provider and whom to contact: Yes       Teaching Concerns Addressed:        Proper use and care of soap (medical equip, care aids, etc.): Yes  Nutritional needs and diet plan: Yes  Pain management techniques: Yes  Wound Care: no directions will show up on the discharge instructions  How and/when to access community resources: Yes     Instructional Materials Used/Given: surgery packet reviewed with patient.  She has a Virtual PAC appt on 01-06-22 at 1000, Covid for  01-24-22.  She will arrange for someone to take her home from surgery and stay with her.  She has no other questions.  She may be off work for 2 weeks due to her job as a  at [employer].   .

## 2021-12-23 NOTE — TELEPHONE ENCOUNTER
Patient is scheduled for surgery with Dr. Guevara    Spoke with: NATHALIE Foley - scheduled with patient in clinic    Date of Surgery: 1/27/2022    Location: [facility]    Informed patient they will need an adult  yes    Pre op with Provider n/a    H&P: Scheduled with PAC on 1/6/2022    Pre-procedure COVID-19 Test: [facility] on 1/24/2022    Additional imaging/appointments: n/a    Surgery packet: Given in clinic     Additional comments: n/a

## 2021-12-23 NOTE — PROGRESS NOTES
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  Her last surgery was about 3 years ago.  She is developed a recurrent tumor.  She does lifting as part of her work and it is uncomfortable when lifting heavier things.    On examination she is alert oriented has a normal mood and affect and is in no acute distress.  In her left wrist there are 2 masses underneath her scar.  There is no swelling in the hand and she has normal motion of the hand and wrist and fingers.    I reviewed this patient's MRI and can see 2 masses around the tendons on the radial aspect of the wrist.  I am concerned that the surround these tendons.  The masses are consistent with a tenosynovial giant cell tumor.    I talked with the patient about different approaches.  These are very surgically resectable and so I would recommend a surgical approach again.  I do not think a medical treatment would be indicated for these.  The patient is concerned about further recurrence.  I think if we keep having recurrences from the tumor within the tendon then we would consider removal of the tendon reconstruction.  This would have to be done in coordination with hand surgery.  I would reserve this approach for recurrence after her next surgery.  I have answered all her questions.

## 2021-12-29 DIAGNOSIS — Z11.59 ENCOUNTER FOR SCREENING FOR OTHER VIRAL DISEASES: ICD-10-CM

## 2022-01-06 ENCOUNTER — ANESTHESIA EVENT (OUTPATIENT)
Dept: SURGERY | Facility: CLINIC | Age: 27
End: 2022-01-06

## 2022-01-06 ENCOUNTER — VIRTUAL VISIT (OUTPATIENT)
Dept: SURGERY | Facility: CLINIC | Age: 27
End: 2022-01-06
Payer: COMMERCIAL

## 2022-01-06 ENCOUNTER — PRE VISIT (OUTPATIENT)
Dept: SURGERY | Facility: CLINIC | Age: 27
End: 2022-01-06

## 2022-01-06 DIAGNOSIS — Z01.818 PREOP EXAMINATION: Primary | ICD-10-CM

## 2022-01-06 DIAGNOSIS — D48.19 GIANT CELL TUMOR OF TENDON SHEATH: ICD-10-CM

## 2022-01-06 PROCEDURE — 99203 OFFICE O/P NEW LOW 30 MIN: CPT | Mod: 95 | Performed by: CLINICAL NURSE SPECIALIST

## 2022-01-06 ASSESSMENT — LIFESTYLE VARIABLES: TOBACCO_USE: 0

## 2022-01-06 ASSESSMENT — PAIN SCALES - GENERAL: PAINLEVEL: MODERATE PAIN (5)

## 2022-01-06 NOTE — PATIENT INSTRUCTIONS
Preparing for Your Surgery      Name:  Paul Loera   MRN:  [id number]   :  1995   Today's Date:  2022         Arriving for surgery:  Surgery date:  2022  Arrival time:  10:20AM    Restrictions due to COVID 19:  1 consistent visitor is allowed per patient  No ill visitors  Visitors must wear face mask       parking is available for anyone with mobility limitations or disabilities. (Monday- Friday 7 am- 5 pm)    Please come to:    [facility]  [address] [place] [phone]    Check in on the 5th floor, Ambulatory Surgery Center.    What can I eat or drink?    -  You may eat and drink normally until 8 hours before surgery. (Until 3:50AM)  -  You may have clear liquids up to 4 hours before surgery. (Until 7:50AM)    Examples of clear liquids:  Water  Clear broth  Juices (apple, white grape, white cranberry  and cider) without pulp  Noncarbonated, powder based beverages  (lemonade and Harjeet-Aid)  Sodas (Sprite, 7-Up, ginger ale and seltzer)  Coffee or tea (without milk or cream)  Gatorade    --No alcohol for at least 24 hours before surgery    Which medicines can I take?    Hold Aspirin for 7 days before surgery.   Hold Multivitamins for 7 days before surgery.  Hold Supplements for 7 days before surgery.  **Hold Ibuprofen (Advil, Motrin) for 1 day before surgery--unless otherwise directed by surgeon.  Hold Naproxen (Aleve) for 4 days before surgery.    -  DO NOT take the following medications the day of surgery:  Iron    -  PLEASE TAKE the following medications the day of surgery   Tylenol if needed    How do I prepare myself?  - Please take 2 showers before surgery using Scrubcare or Hibiclens soap.    Use this soap only from the neck to your toes.     Leave the soap on your skin for one minute--then rinse thoroughly.      You may use your own shampoo and conditioner; no other hair products.   - Please remove all jewelry and body piercings.  - No lotions,  deodorants or fragrance.  - No makeup or fingernail polish.   - Bring your ID and insurance card.    -If you have a Deep Brain Stimulator, a Spinal Cord Stimulator or any implanted Neuro device you must bring the remote to the Surgery Center        - All patients are required to have a Covid-19 test within 4 days of surgery/procedure.      -Patients will be contacted by the [facility] scheduling team within 1 week of surgery to make an appointment.      - Patients may call the Scheduling team at [phone] if they have not been scheduled within 4 days of  surgery.      ALL PATIENTS ARE REQUIRED TO HAVE A RESPONSIBLE ADULT TO DRIVE AND BE IN ATTENDANCE WITH THEM FOR 24 HOURS FOLLOWING SURGERY       Questions or Concerns:    -For questions regarding the day of surgery please contact the Ambulatory Surgery Center at [phone].    -If you have health changes between today and your surgery please contact your surgeon.     For questions after surgery please call your surgeons office.

## 2022-01-06 NOTE — H&P (VIEW-ONLY)
Pre-Operative H & P     CC:  Preoperative exam to assess for increased cardiopulmonary risk while undergoing surgery and anesthesia.    Date of Encounter: 1/6/2022  Primary Care Physician:  No Ref-Primary, Physician     Reason for visit:   Encounter Diagnoses   Name Primary?     Preop examination Yes     Giant cell tumor of tendon sheath          HPI  Paul Loera is a 26 year old female who presents for pre-operative H & P in preparation for Excision left wrist masses with Dr. Guevara on 1/27/22 at [facility].     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 She last had surgery in 2018. She has more recently developed a recurrent tumor at her left wrist which is becoming uncomfortable and beginning to interfere with her work. She returned to Dr. Guevara and was counseled for above procedure.     Her history is otherwise significant for uncomplicated asthma, sports related in the past, and VINH from heavy menses.She continues to take an iron supplement.    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 The scar affects the central portion of the macula limiting the vision significantly. She has followed with Ophthalmology in the past and has had 3 surgeries with lens implant.    Hx of abnormal bleeding or anti-platelet use: Denies.     Menstrual history: Patient's last menstrual period was 12/27/2021.    Prior to Admission Medications  Current Outpatient Medications   Medication Sig Dispense Refill     acetaminophen (TYLENOL) 325 MG tablet Take 2 tablets (650 mg) by mouth every 4 hours as needed for other (mild pain) 60 tablet 1     Ferrous Gluconate 240 (27 Fe) MG TABS Take 240 mg by mouth every evening        ibuprofen (ADVIL/MOTRIN) 600 MG tablet Take 1 tablet (600 mg) by mouth every 6 hours as needed for pain (mild) 40 tablet 0     CEPHALEXIN PO Take 500 mg  by mouth 2 times daily  (Patient not taking: Reported on 12/23/2021)       oxyCODONE IR (ROXICODONE) 5 MG tablet Take 1-2 tablets (5-10 mg) by mouth every 4 hours as needed for pain or moderate to severe pain (Moderate to Severe) (Patient not taking: Reported on 12/23/2021) 10 tablet 0     senna-docusate (SENOKOT-S;PERICOLACE) 8.6-50 MG per tablet Take 1-2 tablets by mouth 2 times daily as needed for constipation Take while on oral narcotics to prevent or treat constipation. (Patient not taking: Reported on 12/23/2021) 30 tablet 1       Family History  Family History   Problem Relation Age of Onset     Other Cancer Paternal Grandmother        Past Medical History:   Diagnosis Date     Anemia      Ankle impingement syndrome, left      Central loss of vision, left      Giant cell tumor of tendon sheath      PONV (postoperative nausea and vomiting)      Tenosynovitis     left achilles     Uncomplicated asthma       Past Surgical History:   Procedure Laterality Date     ARTHROSCOPY HIP, CORRECT IMPINGEMENT FEMOROACETABULAR, REPAIR TEAR LABRAL, COMBINED Left 2010     EXCISE MASS WRIST Left 10/27/2016    Procedure: EXCISE MASS WRIST;  Surgeon: Pankaj Guevara MD;  [facility]     EXCISE MASS WRIST Left 9/27/2018    Procedure: EXCISE MASS WRIST;  Excision Left Wrist Masses;  Surgeon: Pankaj Guevara MD;  [facility]     GALLBLADDER SURGERY  2014     LENGTHEN TENDON ACHILLES Left 9/23/2015    Procedure: LENGTHEN TENDON ACHILLES;  Surgeon: Chandana Bunch MD;  [facility]     OPEN PARTIAL TALUS EXCISION Left 3/16/2016    MD Julio C     SHOULDER SURGERY Right 2011, 2012, 2013     [facility] PELVIS/HIP JOINT SURGERY UNLISTED       [facility] SHOULDER SURG PROC UNLISTED       [facility] STOMACH SURGERY PROCEDURE UNLISTED        No Known Allergies   Social History     Tobacco Use     Smoking status: Never Smoker     Smokeless tobacco: Never Used   Substance Use Topics     Alcohol use: Yes     Comment: occasional        Wt Readings from Last 1 Encounters:   12/23/21 69.9 kg (154 lb)        Anesthesia Evaluation     History of anesthetic complications  - PONV.  Chart history PONV.    ROS/MED HISTORY  The complete review of systems is negative other than noted in the HPI or here.    ENT/Pulmonary: Comment: History of sports associated asthma with no recent issues or use of inhaler  COVD in 8/2021-minimal symptoms. Did not seek care.    (+) asthma Last exacerbation: None,   (-) tobacco use and recent URI   Neurologic:  - neg neurologic ROS     Cardiovascular:     (+) -----No previous cardiac testing  (-) taking anticoagulants/antiplatelets   METS/Exercise Tolerance: >4 METS    Hematologic: Comments: History of anemia associated with heavy periods. Iron supplement.    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 Central loss of vision.     (+) LMP: 12/27/21, ,      Virtual visit -  No vitals were obtained       Physical Exam  Constitutional: Awake, alert, no apparent distress, and appears stated age.  HENT: Normocephalic  Respiratory: non labored breathing   Neurologic: Oriented to name, place and time.   Neuropsychiatric: Calm, cooperative. Normal affect.       PRIOR LABS/DIAGNOSTIC STUDIES:   All labs and imaging personally reviewed       EKG: Not indicated.   Labs not indicated.    12/23/21 MR Left wrist                                                 IMPRESSION: In this patient with previous history of recurrent  tenosynovial giant tumor of tendon sheath in the first  compartment,  status post interim resection on 9/27/2018, recurrent enhancing soft  tissue mass in extensor compartment I is consistent with recurrent  tenosynovial giant cell tumor.     The patient's records and results personally reviewed by this provider.     Outside records reviewed from: care everywhere      ASSESSMENT and PLAN  Paul Loera is a 26 year old female who presents for pre-operative H & P in preparation for Excision left wrist masses with Dr. Guevara on 1/27/22 at [facility].  RCRI: 0.4% risk of serious cardiac event  Risk classification for surgery procedure: Low  VTE risk: 0.26%  CRISSY: 0/8=Low risk   PONV: 3-4. 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 Above procedure now planned.   --Chart history of PONV. Patient does not report this today.   --Generally healthy with no significant cardiopulmonary history. Nonsmoker. Good activity tolerance.   --Past history of sports related asthma but no recent issues or use of inhaler.   --History of anemia related to menses. Continues on iron supplement at .  --Past history of left retinal trauma s/p 3 surgeries with lens implant. Left central loss of vision due to scar. Wears glasses.   --Denies history of blood transfusion.       ** Patient's visit was done virtually today.  A full physical exam was not completed.  Please refer to the physical examination documented by the anesthesiologist in the anesthesia record on the day of surgery. **      The patient is optimized and an acceptable candidate for the proposed procedure. Arrival time, NPO, shower and medication instructions provided by nursing staff today.      Video-Visit Details    Type of service:  Video Visit    Patient verbally consented to video service today: YES      Video Start Time: 10:22am   Video End Time (time video stopped): 10:29am     Originating Location (pt. Location):  Home    Distant Location (provider location):  [facility] PREOPERATIVE ASSESSMENT [facility]ENTER     Mode of Communication:  Video Conference via Doximity-patient preference      On the day of service:     Prep time: 10 minutes  Visit time: 7 minutes  Documentation time: 25 minutes  ------------------------------------------  Total time: 42 minutes      HOANG Hood CNS  Preoperative Assessment Center  [facility]  Phone: [phone]  Fax: [phone]

## 2022-01-06 NOTE — ANESTHESIA PREPROCEDURE EVALUATION
Anesthesia Pre-Procedure Evaluation    Patient: Paul Loera   MRN: [id number] : 1995        Preoperative Diagnosis: * No surgery found *    Procedure :   Video Visit       Past Medical History:   Diagnosis Date     Anemia      Ankle impingement syndrome, left      Central loss of vision, left      Giant cell tumor of tendon sheath      PONV (postoperative nausea and vomiting)      Tenosynovitis     left achilles     Uncomplicated asthma       Past Surgical History:   Procedure Laterality Date     ARTHROSCOPY HIP, CORRECT IMPINGEMENT FEMOROACETABULAR, REPAIR TEAR LABRAL, COMBINED Left      EXCISE MASS WRIST Left 10/27/2016    Procedure: EXCISE MASS WRIST;  Surgeon: Pankaj Guevara MD;  [facility]     EXCISE MASS WRIST Left 2018    Procedure: EXCISE MASS WRIST;  Excision Left Wrist Masses;  Surgeon: Pankaj Guevara MD;  [facility]     GALLBLADDER SURGERY       LENGTHEN TENDON ACHILLES Left 2015    Procedure: LENGTHEN TENDON ACHILLES;  Surgeon: Chandana Bunch MD;  [facility]     OPEN PARTIAL TALUS EXCISION Left 3/16/2016    MD Julio C     SHOULDER SURGERY Right , ,      [facility] PELVIS/HIP JOINT SURGERY UNLISTED       [facility] SHOULDER SURG PROC UNLISTED       [facility] STOMACH SURGERY PROCEDURE UNLISTED        No Known Allergies   Social History     Tobacco Use     Smoking status: Never Smoker     Smokeless tobacco: Never Used   Substance Use Topics     Alcohol use: Yes     Comment: occasional       Wt Readings from Last 1 Encounters:   21 69.9 kg (154 lb)        Anesthesia Evaluation   Pt has had prior anesthetic. Type: General.    History of anesthetic complications  - PONV.  Chart history PONV.    ROS/MED HX  ENT/Pulmonary: Comment: History of sports associated asthma with no recent issues or use of inhaler  COVD in 2021-minimal symptoms. Did not seek care.    (+) asthma Last exacerbation: None,   (-) tobacco use and recent URI    Neurologic:  - neg neurologic ROS     Cardiovascular:     (+) -----No previous cardiac testing  (-) taking anticoagulants/antiplatelets   METS/Exercise Tolerance: >4 METS    Hematologic: Comments: History of anemia associated with heavy periods. Iron supplement.    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 Central loss of vision.     (+) LMP: 12/27/21, ,         Physical Exam    Airway   unable to assess          Respiratory Devices and Support         Dental  no notable dental history         Cardiovascular    unable to assess         Pulmonary    Unable to assess           Other findings: Virtual visit    OUTSIDE LABS:  CBC:   Lab Results   Component Value Date    WBC 5.0 10/25/2016    WBC 4.9 02/11/2016    HGB 11.1 (L) 10/25/2016    HGB 11.3 (L) 02/11/2016    HCT 34.3 (L) 10/25/2016    HCT 36.1 02/11/2016     10/25/2016     02/11/2016     BMP:   Lab Results   Component Value Date     12/29/2016     10/25/2016    POTASSIUM 4.0 12/29/2016    POTASSIUM 3.8 10/25/2016    CHLORIDE 109 12/29/2016    CHLORIDE 106 10/25/2016    CO2 27 12/29/2016    CO2 24 10/25/2016    BUN 9 12/29/2016    BUN 12 10/25/2016    CR 0.80 12/29/2016    CR 0.92 10/25/2016    GLC 90 12/29/2016    GLC 92 10/25/2016     COAGS: No results found for: PTT, INR, FIBR  POC:   Lab Results   Component Value Date    HCG Negative 09/27/2018     HEPATIC:   Lab Results   Component Value Date    ALBUMIN 3.6 12/29/2016     OTHER:   Lab Results   Component Value Date    SHILPA 8.3 (L)  12/29/2016             PAC Discussion and Assessment    ASA Classification: 2  Case is suitable for: ASC  Anesthetic techniques and relevant risks discussed: GA  Invasive monitoring and risk discussed: No    Possibility and Risk of blood transfusion discussed: No            PAC Resident/NP Anesthesia Assessment: ASSESSMENT and PLAN  Paul Loera is a 26 year old female who presents for pre-operative H & P in preparation for Excision left wrist masses with Dr. Guevara on 1/27/22 at [facility].  RCRI: 0.4% risk of serious cardiac event  Risk classification for surgery procedure: Low  VTE risk: 0.26%  CRISSY: 0/8=Low risk   PONV: 3-4. 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 Above procedure now planned.   --Chart history of PONV. Patient does not report this today.   --Generally healthy with no significant cardiopulmonary history. Nonsmoker. Good activity tolerance.   --Past history of sports related asthma but no recent issues or use of inhaler.   --History of anemia related to menses. Continues on iron supplement at .  --Past history of left retinal trauma s/p 3 surgeries with lens implant. Left central loss of vision due to scar. Wears glasses.   --Denies history of blood transfusion.       ** Patient's visit was done virtually today.  A full physical exam was not completed.  Please refer to the physical examination documented by the anesthesiologist in the anesthesia record on the day of surgery. **      The patient is optimized and an acceptable candidate for the proposed procedure. Arrival time, NPO, shower and medication instructions provided by nursing staff today.      Reviewed and Signed by PAC Mid-Level Provider/Resident  Mid-Level Provider/Resident: HOANG Velez, CNS  Date: 1/6/22  Time: 12:00pm                               HOANG Hood

## 2022-01-06 NOTE — H&P
Pre-Operative H & P     CC:  Preoperative exam to assess for increased cardiopulmonary risk while undergoing surgery and anesthesia.    Date of Encounter: 1/6/2022  Primary Care Physician:  No Ref-Primary, Physician     Reason for visit:   Encounter Diagnoses   Name Primary?     Preop examination Yes     Giant cell tumor of tendon sheath          HPI  Paul Loera is a 26 year old female who presents for pre-operative H & P in preparation for Excision left wrist masses with Dr. Guevara on 1/27/22 at [facility].     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 She last had surgery in 2018. She has more recently developed a recurrent tumor at her left wrist which is becoming uncomfortable and beginning to interfere with her work. She returned to Dr. Guevara and was counseled for above procedure.     Her history is otherwise significant for uncomplicated asthma, sports related in the past, and VINH from heavy menses.She continues to take an iron supplement.    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 The scar affects the central portion of the macula limiting the vision significantly. She has followed with Ophthalmology in the past and has had 3 surgeries with lens implant.    Hx of abnormal bleeding or anti-platelet use: Denies.     Menstrual history: Patient's last menstrual period was 12/27/2021.    Prior to Admission Medications  Current Outpatient Medications   Medication Sig Dispense Refill     acetaminophen (TYLENOL) 325 MG tablet Take 2 tablets (650 mg) by mouth every 4 hours as needed for other (mild pain) 60 tablet 1     Ferrous Gluconate 240 (27 Fe) MG TABS Take 240 mg by mouth every evening        ibuprofen (ADVIL/MOTRIN) 600 MG tablet Take 1 tablet (600 mg) by mouth every 6 hours as needed for pain (mild) 40 tablet 0     CEPHALEXIN PO Take 500 mg  by mouth 2 times daily  (Patient not taking: Reported on 12/23/2021)       oxyCODONE IR (ROXICODONE) 5 MG tablet Take 1-2 tablets (5-10 mg) by mouth every 4 hours as needed for pain or moderate to severe pain (Moderate to Severe) (Patient not taking: Reported on 12/23/2021) 10 tablet 0     senna-docusate (SENOKOT-S;PERICOLACE) 8.6-50 MG per tablet Take 1-2 tablets by mouth 2 times daily as needed for constipation Take while on oral narcotics to prevent or treat constipation. (Patient not taking: Reported on 12/23/2021) 30 tablet 1       Family History  Family History   Problem Relation Age of Onset     Other Cancer Paternal Grandmother        Past Medical History:   Diagnosis Date     Anemia      Ankle impingement syndrome, left      Central loss of vision, left      Giant cell tumor of tendon sheath      PONV (postoperative nausea and vomiting)      Tenosynovitis     left achilles     Uncomplicated asthma       Past Surgical History:   Procedure Laterality Date     ARTHROSCOPY HIP, CORRECT IMPINGEMENT FEMOROACETABULAR, REPAIR TEAR LABRAL, COMBINED Left 2010     EXCISE MASS WRIST Left 10/27/2016    Procedure: EXCISE MASS WRIST;  Surgeon: Pankaj Guevara MD;  [facility]     EXCISE MASS WRIST Left 9/27/2018    Procedure: EXCISE MASS WRIST;  Excision Left Wrist Masses;  Surgeon: Pankaj Guevara MD;  [facility]     GALLBLADDER SURGERY  2014     LENGTHEN TENDON ACHILLES Left 9/23/2015    Procedure: LENGTHEN TENDON ACHILLES;  Surgeon: Chandana Bunch MD;  [facility]     OPEN PARTIAL TALUS EXCISION Left 3/16/2016    MD Julio C     SHOULDER SURGERY Right 2011, 2012, 2013     [facility] PELVIS/HIP JOINT SURGERY UNLISTED       [facility] SHOULDER SURG PROC UNLISTED       [facility] STOMACH SURGERY PROCEDURE UNLISTED        No Known Allergies   Social History     Tobacco Use     Smoking status: Never Smoker     Smokeless tobacco: Never Used   Substance Use Topics     Alcohol use: Yes     Comment: occasional        Wt Readings from Last 1 Encounters:   12/23/21 69.9 kg (154 lb)        Anesthesia Evaluation     History of anesthetic complications  - PONV.  Chart history PONV.    ROS/MED HISTORY  The complete review of systems is negative other than noted in the HPI or here.    ENT/Pulmonary: Comment: History of sports associated asthma with no recent issues or use of inhaler  COVD in 8/2021-minimal symptoms. Did not seek care.    (+) asthma Last exacerbation: None,   (-) tobacco use and recent URI   Neurologic:  - neg neurologic ROS     Cardiovascular:     (+) -----No previous cardiac testing  (-) taking anticoagulants/antiplatelets   METS/Exercise Tolerance: >4 METS    Hematologic: Comments: History of anemia associated with heavy periods. Iron supplement.    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 Central loss of vision.     (+) LMP: 12/27/21, ,      Virtual visit -  No vitals were obtained       Physical Exam  Constitutional: Awake, alert, no apparent distress, and appears stated age.  HENT: Normocephalic  Respiratory: non labored breathing   Neurologic: Oriented to name, place and time.   Neuropsychiatric: Calm, cooperative. Normal affect.       PRIOR LABS/DIAGNOSTIC STUDIES:   All labs and imaging personally reviewed       EKG: Not indicated.   Labs not indicated.    12/23/21 MR Left wrist                                                 IMPRESSION: In this patient with previous history of recurrent  tenosynovial giant tumor of tendon sheath in the first  compartment,  status post interim resection on 9/27/2018, recurrent enhancing soft  tissue mass in extensor compartment I is consistent with recurrent  tenosynovial giant cell tumor.     The patient's records and results personally reviewed by this provider.     Outside records reviewed from: care everywhere      ASSESSMENT and PLAN  Paul Loera is a 26 year old female who presents for pre-operative H & P in preparation for Excision left wrist masses with Dr. Guevara on 1/27/22 at [facility].  RCRI: 0.4% risk of serious cardiac event  Risk classification for surgery procedure: Low  VTE risk: 0.26%  CRISSY: 0/8=Low risk   PONV: 3-4. 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 Above procedure now planned.   --Chart history of PONV. Patient does not report this today.   --Generally healthy with no significant cardiopulmonary history. Nonsmoker. Good activity tolerance.   --Past history of sports related asthma but no recent issues or use of inhaler.   --History of anemia related to menses. Continues on iron supplement at .  --Past history of left retinal trauma s/p 3 surgeries with lens implant. Left central loss of vision due to scar. Wears glasses.   --Denies history of blood transfusion.       ** Patient's visit was done virtually today.  A full physical exam was not completed.  Please refer to the physical examination documented by the anesthesiologist in the anesthesia record on the day of surgery. **      The patient is optimized and an acceptable candidate for the proposed procedure. Arrival time, NPO, shower and medication instructions provided by nursing staff today.      Video-Visit Details    Type of service:  Video Visit    Patient verbally consented to video service today: YES      Video Start Time: 10:22am   Video End Time (time video stopped): 10:29am     Originating Location (pt. Location):  Home    Distant Location (provider location):  [facility] PREOPERATIVE ASSESSMENT [facility]     Mode of Communication:  Video Conference via Doximity-patient preference      On the day of service:     Prep time: 10 minutes  Visit time: 7 minutes  Documentation time: 25 minutes  ------------------------------------------  Total time: 42 minutes      HOANG Hood CNS  Preoperative Assessment Center  [facility]  Clinic and Surgery Center  Phone: [phone]  Fax: [phone]

## 2022-01-06 NOTE — PROGRESS NOTES
Paul is a 26 year old who is being evaluated via a billable video visit.      How would you like to obtain your AVS? MyChart  If the video visit is dropped, the invitation should be resent by: Text to cell phone: [phone]       HPI         Review of Systems         Objective    Vitals - Patient Reported  Pain Score: Moderate Pain (5) (wrist)        Physical Exam

## 2022-01-17 ENCOUNTER — TELEPHONE (OUTPATIENT)
Dept: ORTHOPEDICS | Facility: CLINIC | Age: 27
End: 2022-01-17
Payer: COMMERCIAL

## 2022-01-17 NOTE — TELEPHONE ENCOUNTER
RN returned call to Paul.  She is asking about getting the vaccine prior to her upcoming surgery.  RN left voice message 2 weeks prior is what we like.

## 2022-01-24 ENCOUNTER — LAB (OUTPATIENT)
Dept: LAB | Facility: CLINIC | Age: 27
End: 2022-01-24
Payer: COMMERCIAL

## 2022-01-24 DIAGNOSIS — Z11.59 ENCOUNTER FOR SCREENING FOR OTHER VIRAL DISEASES: ICD-10-CM

## 2022-01-24 PROCEDURE — U0005 INFEC AGEN DETEC AMPLI PROBE: HCPCS | Mod: 90 | Performed by: PATHOLOGY

## 2022-01-24 PROCEDURE — 99000 SPECIMEN HANDLING OFFICE-LAB: CPT | Performed by: PATHOLOGY

## 2022-01-24 PROCEDURE — U0003 INFECTIOUS AGENT DETECTION BY NUCLEIC ACID (DNA OR RNA); SEVERE ACUTE RESPIRATORY SYNDROME CORONAVIRUS 2 (SARS-COV-2) (CORONAVIRUS DISEASE [COVID-19]), AMPLIFIED PROBE TECHNIQUE, MAKING USE OF HIGH THROUGHPUT TECHNOLOGIES AS DESCRIBED BY CMS-2020-01-R: HCPCS | Mod: 90 | Performed by: PATHOLOGY

## 2022-01-25 LAB — SARS-COV-2 RNA RESP QL NAA+PROBE: NEGATIVE

## 2022-01-26 ENCOUNTER — ANESTHESIA EVENT (OUTPATIENT)
Dept: SURGERY | Facility: AMBULATORY SURGERY CENTER | Age: 27
End: 2022-01-26
Payer: COMMERCIAL

## 2022-01-26 RX ORDER — HYDROCODONE BITARTRATE AND ACETAMINOPHEN 5; 325 MG/1; MG/1
1-2 TABLET ORAL EVERY 4 HOURS PRN
Qty: 5 TABLET | Refills: 0 | Status: CANCELLED | OUTPATIENT
Start: 2022-01-26

## 2022-01-26 NOTE — BRIEF OP NOTE
Brief Operative Note  January 27, 2022  Paul Loera  1995  [id number]    Pre Op Diagnosis: left wrist 1st dorsal compartment extensor tendon giant cell tumor  Post Op Diagnosis: Same    Procedure Performed: excision of left wrist 1st dorsal compartment extensor tendon giant cell tumor    Surgeon: Dr. Guevara  Assistant: Moises Lofton, PGY4    Anesthesia: general  EBL: 5ml  Specimen: wrist tumors sent for path  Findings: Please see detailed Op Note  Complications: None  Implants: none    Post-Op Plan    Activity: Up with assist until independent  Weight bearing status: WBAT LUE  Bracing/Splinting: none  Imaging: none needed  Dressings: LUE soft dressings, okay to remove POD2-3 and shower thereafter, no soaking in tubs/pools x 2 weeks  Diet: ADAT  Pain management: okay for toradol; ibuprofen and tylenol; oxycodone ordered  Follow-up: our clinical team will call to schedule  Disposition: Okay for discharge from PACU when meeting same-day surgery criteria.    Moises Lofton MD  Orthopaedic Surgery, PGY4  Pager: [phone]

## 2022-01-27 ENCOUNTER — HOSPITAL ENCOUNTER (OUTPATIENT)
Facility: AMBULATORY SURGERY CENTER | Age: 27
End: 2022-01-27
Attending: ORTHOPAEDIC SURGERY
Payer: COMMERCIAL

## 2022-01-27 ENCOUNTER — ANESTHESIA (OUTPATIENT)
Dept: SURGERY | Facility: AMBULATORY SURGERY CENTER | Age: 27
End: 2022-01-27
Payer: COMMERCIAL

## 2022-01-27 VITALS
HEART RATE: 52 BPM | SYSTOLIC BLOOD PRESSURE: 119 MMHG | WEIGHT: 154 LBS | HEIGHT: 72 IN | BODY MASS INDEX: 20.86 KG/M2 | DIASTOLIC BLOOD PRESSURE: 79 MMHG | TEMPERATURE: 97 F | RESPIRATION RATE: 16 BRPM | OXYGEN SATURATION: 100 %

## 2022-01-27 DIAGNOSIS — D48.19 GIANT CELL TUMOR OF TENDON SHEATH: ICD-10-CM

## 2022-01-27 LAB
HCG UR QL: NEGATIVE
INTERNAL QC OK POCT: NORMAL
POCT KIT EXPIRATION DATE: NORMAL
POCT KIT LOT NUMBER: NORMAL

## 2022-01-27 PROCEDURE — 88307 TISSUE EXAM BY PATHOLOGIST: CPT | Mod: TC | Performed by: ORTHOPAEDIC SURGERY

## 2022-01-27 PROCEDURE — 25110 REMOVE WRIST TENDON LESION: CPT | Mod: LT

## 2022-01-27 PROCEDURE — 25110 REMOVE WRIST TENDON LESION: CPT | Mod: LT | Performed by: ORTHOPAEDIC SURGERY

## 2022-01-27 PROCEDURE — 88307 TISSUE EXAM BY PATHOLOGIST: CPT | Mod: 26 | Performed by: PATHOLOGY

## 2022-01-27 PROCEDURE — 81025 URINE PREGNANCY TEST: CPT | Performed by: PATHOLOGY

## 2022-01-27 RX ORDER — ONDANSETRON 4 MG/1
4 TABLET, ORALLY DISINTEGRATING ORAL EVERY 30 MIN PRN
Status: DISCONTINUED | OUTPATIENT
Start: 2022-01-27 | End: 2022-01-28 | Stop reason: HOSPADM

## 2022-01-27 RX ORDER — LIDOCAINE HYDROCHLORIDE 20 MG/ML
INJECTION, SOLUTION INFILTRATION; PERINEURAL PRN
Status: DISCONTINUED | OUTPATIENT
Start: 2022-01-27 | End: 2022-01-27

## 2022-01-27 RX ORDER — FENTANYL CITRATE 50 UG/ML
INJECTION, SOLUTION INTRAMUSCULAR; INTRAVENOUS PRN
Status: DISCONTINUED | OUTPATIENT
Start: 2022-01-27 | End: 2022-01-27

## 2022-01-27 RX ORDER — GABAPENTIN 300 MG/1
300 CAPSULE ORAL
Status: COMPLETED | OUTPATIENT
Start: 2022-01-27 | End: 2022-01-27

## 2022-01-27 RX ORDER — SODIUM CHLORIDE, SODIUM LACTATE, POTASSIUM CHLORIDE, CALCIUM CHLORIDE 600; 310; 30; 20 MG/100ML; MG/100ML; MG/100ML; MG/100ML
INJECTION, SOLUTION INTRAVENOUS CONTINUOUS
Status: DISCONTINUED | OUTPATIENT
Start: 2022-01-27 | End: 2022-01-27 | Stop reason: HOSPADM

## 2022-01-27 RX ORDER — SCOLOPAMINE TRANSDERMAL SYSTEM 1 MG/1
1 PATCH, EXTENDED RELEASE TRANSDERMAL ONCE
Status: DISCONTINUED | OUTPATIENT
Start: 2022-01-27 | End: 2022-01-28 | Stop reason: HOSPADM

## 2022-01-27 RX ORDER — SODIUM CHLORIDE, SODIUM LACTATE, POTASSIUM CHLORIDE, CALCIUM CHLORIDE 600; 310; 30; 20 MG/100ML; MG/100ML; MG/100ML; MG/100ML
INJECTION, SOLUTION INTRAVENOUS CONTINUOUS
Status: DISCONTINUED | OUTPATIENT
Start: 2022-01-27 | End: 2022-01-28 | Stop reason: HOSPADM

## 2022-01-27 RX ORDER — PROPOFOL 10 MG/ML
INJECTION, EMULSION INTRAVENOUS CONTINUOUS PRN
Status: DISCONTINUED | OUTPATIENT
Start: 2022-01-27 | End: 2022-01-27

## 2022-01-27 RX ORDER — OXYCODONE HYDROCHLORIDE 5 MG/1
5-10 TABLET ORAL EVERY 4 HOURS PRN
Qty: 25 TABLET | Refills: 0 | Status: SHIPPED | OUTPATIENT
Start: 2022-01-27

## 2022-01-27 RX ORDER — MEPERIDINE HYDROCHLORIDE 25 MG/ML
12.5 INJECTION INTRAMUSCULAR; INTRAVENOUS; SUBCUTANEOUS
Status: DISCONTINUED | OUTPATIENT
Start: 2022-01-27 | End: 2022-01-28 | Stop reason: HOSPADM

## 2022-01-27 RX ORDER — ONDANSETRON 2 MG/ML
INJECTION INTRAMUSCULAR; INTRAVENOUS PRN
Status: DISCONTINUED | OUTPATIENT
Start: 2022-01-27 | End: 2022-01-27

## 2022-01-27 RX ORDER — LIDOCAINE 40 MG/G
CREAM TOPICAL
Status: DISCONTINUED | OUTPATIENT
Start: 2022-01-27 | End: 2022-01-27 | Stop reason: HOSPADM

## 2022-01-27 RX ORDER — GLYCOPYRROLATE 0.2 MG/ML
INJECTION, SOLUTION INTRAMUSCULAR; INTRAVENOUS PRN
Status: DISCONTINUED | OUTPATIENT
Start: 2022-01-27 | End: 2022-01-27

## 2022-01-27 RX ORDER — CEFAZOLIN SODIUM 2 G/50ML
2 SOLUTION INTRAVENOUS
Status: DISCONTINUED | OUTPATIENT
Start: 2022-01-27 | End: 2022-01-27 | Stop reason: HOSPADM

## 2022-01-27 RX ORDER — DEXAMETHASONE SODIUM PHOSPHATE 4 MG/ML
INJECTION, SOLUTION INTRA-ARTICULAR; INTRALESIONAL; INTRAMUSCULAR; INTRAVENOUS; SOFT TISSUE PRN
Status: DISCONTINUED | OUTPATIENT
Start: 2022-01-27 | End: 2022-01-27

## 2022-01-27 RX ORDER — HYDROMORPHONE HYDROCHLORIDE 1 MG/ML
0.2 INJECTION, SOLUTION INTRAMUSCULAR; INTRAVENOUS; SUBCUTANEOUS EVERY 5 MIN PRN
Status: DISCONTINUED | OUTPATIENT
Start: 2022-01-27 | End: 2022-01-27 | Stop reason: HOSPADM

## 2022-01-27 RX ORDER — FENTANYL CITRATE 50 UG/ML
25 INJECTION, SOLUTION INTRAMUSCULAR; INTRAVENOUS
Status: DISCONTINUED | OUTPATIENT
Start: 2022-01-27 | End: 2022-01-28 | Stop reason: HOSPADM

## 2022-01-27 RX ORDER — ACETAMINOPHEN 325 MG/1
975 TABLET ORAL ONCE
Status: COMPLETED | OUTPATIENT
Start: 2022-01-27 | End: 2022-01-27

## 2022-01-27 RX ORDER — PROPOFOL 10 MG/ML
INJECTION, EMULSION INTRAVENOUS PRN
Status: DISCONTINUED | OUTPATIENT
Start: 2022-01-27 | End: 2022-01-27

## 2022-01-27 RX ORDER — FENTANYL CITRATE 50 UG/ML
25 INJECTION, SOLUTION INTRAMUSCULAR; INTRAVENOUS EVERY 5 MIN PRN
Status: DISCONTINUED | OUTPATIENT
Start: 2022-01-27 | End: 2022-01-27 | Stop reason: HOSPADM

## 2022-01-27 RX ORDER — CEFAZOLIN SODIUM 2 G/50ML
2 SOLUTION INTRAVENOUS SEE ADMIN INSTRUCTIONS
Status: DISCONTINUED | OUTPATIENT
Start: 2022-01-27 | End: 2022-01-27 | Stop reason: HOSPADM

## 2022-01-27 RX ORDER — BUPIVACAINE HYDROCHLORIDE 2.5 MG/ML
INJECTION, SOLUTION INFILTRATION; PERINEURAL PRN
Status: DISCONTINUED | OUTPATIENT
Start: 2022-01-27 | End: 2022-01-27 | Stop reason: HOSPADM

## 2022-01-27 RX ORDER — ONDANSETRON 2 MG/ML
4 INJECTION INTRAMUSCULAR; INTRAVENOUS EVERY 30 MIN PRN
Status: DISCONTINUED | OUTPATIENT
Start: 2022-01-27 | End: 2022-01-28 | Stop reason: HOSPADM

## 2022-01-27 RX ORDER — OXYCODONE HYDROCHLORIDE 5 MG/1
5 TABLET ORAL EVERY 4 HOURS PRN
Status: DISCONTINUED | OUTPATIENT
Start: 2022-01-27 | End: 2022-01-28 | Stop reason: HOSPADM

## 2022-01-27 RX ADMIN — SCOLOPAMINE TRANSDERMAL SYSTEM 1 PATCH: 1 PATCH, EXTENDED RELEASE TRANSDERMAL at 11:35

## 2022-01-27 RX ADMIN — ACETAMINOPHEN 975 MG: 325 TABLET ORAL at 11:12

## 2022-01-27 RX ADMIN — PROPOFOL 150 MCG/KG/MIN: 10 INJECTION, EMULSION INTRAVENOUS at 12:25

## 2022-01-27 RX ADMIN — OXYCODONE HYDROCHLORIDE 5 MG: 5 TABLET ORAL at 13:41

## 2022-01-27 RX ADMIN — ONDANSETRON 4 MG: 2 INJECTION INTRAMUSCULAR; INTRAVENOUS at 12:32

## 2022-01-27 RX ADMIN — FENTANYL CITRATE 25 MCG: 50 INJECTION, SOLUTION INTRAMUSCULAR; INTRAVENOUS at 13:41

## 2022-01-27 RX ADMIN — LIDOCAINE HYDROCHLORIDE 60 MG: 20 INJECTION, SOLUTION INFILTRATION; PERINEURAL at 12:25

## 2022-01-27 RX ADMIN — FENTANYL CITRATE 25 MCG: 50 INJECTION, SOLUTION INTRAMUSCULAR; INTRAVENOUS at 13:54

## 2022-01-27 RX ADMIN — FENTANYL CITRATE 25 MCG: 50 INJECTION, SOLUTION INTRAMUSCULAR; INTRAVENOUS at 13:49

## 2022-01-27 RX ADMIN — CEFAZOLIN SODIUM 2 G: 2 SOLUTION INTRAVENOUS at 12:22

## 2022-01-27 RX ADMIN — SODIUM CHLORIDE, SODIUM LACTATE, POTASSIUM CHLORIDE, CALCIUM CHLORIDE: 600; 310; 30; 20 INJECTION, SOLUTION INTRAVENOUS at 12:19

## 2022-01-27 RX ADMIN — PROPOFOL 150 MG: 10 INJECTION, EMULSION INTRAVENOUS at 12:25

## 2022-01-27 RX ADMIN — SODIUM CHLORIDE, SODIUM LACTATE, POTASSIUM CHLORIDE, CALCIUM CHLORIDE: 600; 310; 30; 20 INJECTION, SOLUTION INTRAVENOUS at 11:29

## 2022-01-27 RX ADMIN — GLYCOPYRROLATE 0.2 MG: 0.2 INJECTION, SOLUTION INTRAMUSCULAR; INTRAVENOUS at 12:25

## 2022-01-27 RX ADMIN — LIDOCAINE HYDROCHLORIDE 40 MG: 20 INJECTION, SOLUTION INFILTRATION; PERINEURAL at 12:20

## 2022-01-27 RX ADMIN — FENTANYL CITRATE 25 MCG: 50 INJECTION, SOLUTION INTRAMUSCULAR; INTRAVENOUS at 14:03

## 2022-01-27 RX ADMIN — FENTANYL CITRATE 100 MCG: 50 INJECTION, SOLUTION INTRAMUSCULAR; INTRAVENOUS at 12:25

## 2022-01-27 RX ADMIN — DEXAMETHASONE SODIUM PHOSPHATE 4 MG: 4 INJECTION, SOLUTION INTRA-ARTICULAR; INTRALESIONAL; INTRAMUSCULAR; INTRAVENOUS; SOFT TISSUE at 12:32

## 2022-01-27 RX ADMIN — GABAPENTIN 300 MG: 300 CAPSULE ORAL at 11:12

## 2022-01-27 ASSESSMENT — MIFFLIN-ST. JEOR: SCORE: 1539.98

## 2022-01-27 ASSESSMENT — COPD QUESTIONNAIRES: COPD: 0

## 2022-01-27 ASSESSMENT — LIFESTYLE VARIABLES: TOBACCO_USE: 0

## 2022-01-27 ASSESSMENT — ENCOUNTER SYMPTOMS: SEIZURES: 0

## 2022-01-27 NOTE — DISCHARGE INSTRUCTIONS
"[facility]  Home Care Following Anesthesia  For 24 hours after surgery:  1. Get plenty of rest.  A responsible adult must stay with you for at least 24 hours after you leave the surgery center.  2. Do not drive or use heavy equipment.  If you have weakness or tingling, don't drive or use heavy equipment until this feeling goes away.   3. Do not drink alcohol.   4. Avoid strenuous or risky activities.  Ask for help when climbing stairs.  5. You may feel lightheaded.  IF so, sit for a few minutes before standing.  Have someone help you get up.   6. If you have nausea (feel sick to your stomach): Drink only clear liquids such as apple juice, ginger ale, broth or 7-Up.  Rest may also help.  Be sure to drink enough fluids.  Move to a regular diet as you feel able.   7. You may have a slight fever.  Call the doctor if your fever is over 100 F (37.7 C) (taken under the tongue) or lasts longer than 24 hours.  8. You may have a dry mouth, a sore throat, muscle aches or trouble sleeping. These should go away after 24 hours.  9. Do not make important or legal decisions.   10. It is recommended to avoid smoking.        Today you received a Marcaine or bupivacaine block to numb the nerves near your surgery site.  This is a block using local anesthetic or \"numbing\" medication injected around the nerves to anesthetize or \"numb\" the area supplied by those nerves.  This block is injected into the muscle layer near your surgical site.  The medication may numb the location where you had surgery for 6-18 hours, but may last up to 24 hours.  If your surgical site is an arm or leg you should be careful with your affected limb, since it is possible to injure your limb without being aware of it due to the numbing.  Until full feeling returns, you should guard against bumping or hitting your limb, and avoid extreme hot or cold temperatures on the skin.  As the block wears off, the feeling will return as " a tingling or prickly sensation near your surgical site.  You will experience more discomfort from your incision as the feeling returns.  You may want to take a pain pill (a narcotic or Tylenol if this was prescribed by your surgeon) when you start to experience mild pain before the pain beccomes more severe.  If your pain medications do not control your pain you should notifiy your surgeon.    Tips for taking pain medications  To get the best pain relief possible, remember these points:    Take pain medications as directed, before pain becomes severe.    Pain medication can upset your stomach: taking it with food may help.    Constipation is a common side effect of pain medication. Drink plenty of  fluids.    Eat foods high in fiber. Take a stool softener if recommended by your doctor or pharmacist.    Do not drink alcohol, drive or operate machinery while taking pain medications.    Ask about other ways to control pain, such as with heat, ice or relaxation.    Tylenol/Acetaminophen Consumption  To help encourage the safe use of acetaminophen, the makers of TYLENOL  have lowered the maximum daily dose for single-ingredient Extra Strength TYLENOL  (acetaminophen) products sold in the [place] from 8 pills per day (4,000 mg) to 6 pills per day (3,000 mg). The dosing interval has also changed from 2 pills every 4-6 hours to 2 pills every 6 hours.    If you feel your pain relief is insufficient, you may take Tylenol/Acetaminophen in addition to your narcotic pain medication.     Be careful not to exceed 3,000 mg of Tylenol/Acetaminophen in a 24 hour period from all sources.    If you are taking extra strength Tylenol/acetaminophen (500 mg), the maximum dose is 6 tablets in 24 hours.    If you are taking regular strength acetaminophen (325 mg), the maximum dose is 9 tablets in 24 hours.    975 mg of Tylenol given at 11 am, ok to take more after 5 pm today, then follow bottle instructions.    Call a doctor for any of the  followin. Signs of infection (fever, growing tenderness at the surgery site, a large amount of drainage or bleeding, severe pain, foul-smelling drainage, redness, swelling).  2. It has been over 8 to 10 hours since surgery and you are still not able to urinate (pass water).  3. Headache for over 24 hours.  4. Numbness, tingling or weakness the day after surgery (if you had spinal anesthesia).  5. Signs of Covid-19 infection (temperature over 100 degrees, shortness of breath, cough, loss of taste/smell, generalized body aches, persistent headache, chills, sore throat, nausea/vomiting/diarrhea)  Your doctor is:       Dr. Pankaj Guevara, Orthopaedics: [phone]               Or dial [phone] and ask for the resident on call for:  Orthopaedics  For emergency care, call the:  [facility] Emergency Department: [phone] (TTY for hearing impaired: [phone])

## 2022-01-27 NOTE — ANESTHESIA CARE TRANSFER NOTE
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    Anesthesia Type:   No value filed.     Note:    Oropharynx: oropharynx clear of all foreign objects  Level of Consciousness: awake  Oxygen Supplementation: face mask    Independent Airway: airway patency satisfactory and stable  Dentition: dentition unchanged  Vital Signs Stable: post-procedure vital signs reviewed and stable  Report to RN Given: handoff report given  Patient transferred to: PACU    Handoff Report: Identifed the Patient, Identified the Reponsible Provider, Reviewed the pertinent medical history, Discussed the surgical course, Reviewed Intra-OP anesthesia mangement and issues during anesthesia, Set expectations for post-procedure period and Allowed opportunity for questions and acknowledgement of understanding      Vitals:  Vitals Value Taken Time   BP     Temp     Pulse     Resp     SpO2 100 % 01/27/22 1329   Vitals shown include unvalidated device data.    Electronically Signed By: HOANG Stewart CRNA  January 27, 2022  1:31 PM

## 2022-01-27 NOTE — OP NOTE
DATE OF SURGERY: 1/27/2022    PREOPERATIVE DIAGNOSIS: Tenosynovial giant cell tumor    POSTOPERATIVE DIAGNOSIS: Tenosynovial giant cell tumor    PROCEDURE: #1 excision tenosynovial giant cell tumor left wrist, #2 excision tenosynovial giant cell tumor left forearm    SURGEON: Pankaj Guevara MD     ASSISTANT: Ho Lofton MD    PATIENT HISTORY: This patient had previous surgeries in this area and these tumors are recurrent tumors along the first dorsal compartment extensor tendon.  The patient understands the risks of bleeding infection numbness tingling weakness and stiffness in the wrist and fingers.    DESCRIPTION OF PROCEDURE: The patient underwent successful induction of anesthesia.  The left arm was washed and sterilely prepped and draped.  Beginning with the tumor adjacent to the wrist joint we made an incision excising some of the previous scar in this area.  After incising the skin sharply divided the superficial subcutaneous tissue with cautery and then began dissecting bluntly around the lesion.  The thumb extensor tendon was adherent to the tumor and so remove some of this tendon sheath and was able to excise much of the tumor with that.  I removed other small pieces of tumor from along the tendon sheath until it was all gone.  We copiously irrigated and turned our attention to the more proximal lesion.  I made a separate incision at the proximal end of the patient's previous scar sharply through the skin divided the subcutaneous tissue with cautery and again bluntly dissected into the tendon sheath.  #2 masses involved with the synovium and the more proximal one was quite adherent to the tendon itself.  These were excised sharply with scissors.  We then copiously irrigated both incisions and deflated the tourniquet.  There is no deep bleeding.  We closed with 2-0 Vicryl in the subcutaneous tissue 4-0 Monocryl in the skin Steri-Strips and a sterile dry dressing.  The patient was extubated and taken  to the recovery room in stable condition.  The estimated blood loss is a 5 mL.  There were no complications.  I was present for all critical portions of the procedure.  The specimens were sent in formalin to pathology.    I consider both specimens deep as they were done in the tendon sheath adjacent to the tendons.  The distal tumor was about 1.5 cm and the proximal ones were each less than a centimeter.    Pankaj Guevara MD

## 2022-01-28 NOTE — ANESTHESIA POSTPROCEDURE EVALUATION
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    Anesthesia Type:  General    Note:  Disposition: Outpatient   Postop Pain Control: Uneventful            Sign Out: Well controlled pain   PONV: No   Neuro/Psych: Uneventful            Sign Out: Acceptable/Baseline neuro status   Airway/Respiratory: Uneventful            Sign Out: Acceptable/Baseline resp. status   CV/Hemodynamics: Uneventful            Sign Out: Acceptable CV status; No obvious hypovolemia; No obvious fluid overload   Other NRE: NONE   DID A NON-ROUTINE EVENT OCCUR? No           Last vitals:  Vitals Value Taken Time   /90 01/27/22 1400   Temp 36.1  C (97  F) 01/27/22 1329   Pulse 68 01/27/22 1403   Resp 14 01/27/22 1403   SpO2 100 % 01/27/22 1403   Vitals shown include unvalidated device data.    Electronically Signed By: Robles Ross MD  January 27, 2022  10:50 PM

## 2022-01-28 NOTE — ANESTHESIA PREPROCEDURE EVALUATION
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]    Procedure : Procedure(s):  Excision left wrist masses          Past Medical History:   Diagnosis Date     Anemia      Ankle impingement syndrome, left      Central loss of vision, left      Giant cell tumor of tendon sheath      PONV (postoperative nausea and vomiting)      Tenosynovitis     left achilles     Uncomplicated asthma       Past Surgical History:   Procedure Laterality Date     ARTHROSCOPY HIP, CORRECT IMPINGEMENT FEMOROACETABULAR, REPAIR TEAR LABRAL, COMBINED Left 2010     EXCISE MASS WRIST Left 10/27/2016    Procedure: EXCISE MASS WRIST;  Surgeon: Pankaj Guevara MD;  Location:  OR     EXCISE MASS WRIST Left 2018    Procedure: EXCISE MASS WRIST;  Excision Left Wrist Masses;  Surgeon: Pankaj Guevara MD;  Location:  OR     EYE SURGERY Left     X3 for retinal injury     GALLBLADDER SURGERY  2014     LENGTHEN TENDON ACHILLES Left 2015    Procedure: LENGTHEN TENDON ACHILLES;  Surgeon: Chandana Bunch MD;  Location:  OR     OPEN PARTIAL TALUS EXCISION Left 2016    MD Julio C     SHOULDER SURGERY Right , ,      [facility] PELVIS/HIP JOINT SURGERY UNLISTED       [facility] SHOULDER SURG PROC UNLISTED       [facility] STOMACH SURGERY PROCEDURE UNLISTED        No Known Allergies   Social History     Tobacco Use     Smoking status: Never Smoker     Smokeless tobacco: Never Used   Substance Use Topics     Alcohol use: Yes     Comment: occasional       Wt Readings from Last 1 Encounters:   22 69.9 kg (154 lb)        Anesthesia Evaluation   Pt has had prior anesthetic.     History of anesthetic complications  - PONV.      ROS/MED HX  ENT/Pulmonary:    (-) tobacco use, asthma and COPD   Neurologic:    (-) no seizures and no CVA   Cardiovascular:       METS/Exercise Tolerance: >4 METS     Hematologic:       Musculoskeletal:       GI/Hepatic:    (-) GERD   Renal/Genitourinary:       Endo:       Psychiatric/Substance Use:       Infectious Disease:       Malignancy:       Other:            Physical Exam    Airway  airway exam normal           Respiratory Devices and Support         Dental  no notable dental history         Cardiovascular   cardiovascular exam normal          Pulmonary   pulmonary exam normal                OUTSIDE LABS:  CBC:   Lab Results   Component Value Date    WBC 5.0 10/25/2016    WBC 4.9 02/11/2016    HGB 11.1 (L) 10/25/2016    HGB 11.3 (L) 02/11/2016    HCT 34.3 (L) 10/25/2016    HCT 36.1 02/11/2016     10/25/2016     02/11/2016     BMP:   Lab Results   Component Value Date     12/29/2016     10/25/2016    POTASSIUM 4.0 12/29/2016    POTASSIUM 3.8 10/25/2016    CHLORIDE 109 12/29/2016    CHLORIDE 106 10/25/2016    CO2 27 12/29/2016    CO2 24 10/25/2016    BUN 9 12/29/2016    BUN 12 10/25/2016    CR 0.80 12/29/2016    CR 0.92 10/25/2016    GLC 90 12/29/2016    GLC 92 10/25/2016     COAGS: No results found for: PTT, INR, FIBR  POC:   Lab Results   Component Value Date    HCG Negative 01/27/2022     HEPATIC:   Lab Results   Component Value Date    ALBUMIN 3.6 12/29/2016     OTHER:   Lab Results   Component Value Date    SHILPA 8.3 (L) 12/29/2016       Anesthesia Plan    ASA Status:  1   NPO Status:  NPO Appropriate    Anesthesia Type: General.     - Airway: LMA   Induction: Intravenous.   Maintenance: Balanced.        Consents    Anesthesia Plan(s) and associated risks, benefits, and realistic alternatives discussed. Questions answered and patient/representative(s) expressed understanding.    - Discussed:     - Discussed with:  Patient         Postoperative Care    Pain management: IV analgesics, Oral pain medications, Multi-modal analgesia.   PONV prophylaxis: Ondansetron (or other 5HT-3), Dexamethasone or Solumedrol, Background Propofol Infusion,  Scopolamine patch     Comments:                Robles Ross MD

## 2022-01-31 LAB
PATH REPORT.COMMENTS IMP SPEC: NORMAL
PATH REPORT.COMMENTS IMP SPEC: NORMAL
PATH REPORT.FINAL DX SPEC: NORMAL
PATH REPORT.GROSS SPEC: NORMAL
PATH REPORT.MICROSCOPIC SPEC OTHER STN: NORMAL
PHOTO IMAGE: NORMAL

## 2022-02-11 ENCOUNTER — VIRTUAL VISIT (OUTPATIENT)
Dept: ORTHOPEDICS | Facility: CLINIC | Age: 27
End: 2022-02-11
Payer: COMMERCIAL

## 2022-02-11 DIAGNOSIS — D48.19 GIANT CELL TUMOR OF TENDON SHEATH: Primary | ICD-10-CM

## 2022-02-11 PROCEDURE — 99024 POSTOP FOLLOW-UP VISIT: CPT | Mod: 95 | Performed by: ORTHOPAEDIC SURGERY

## 2022-02-11 NOTE — LETTER
Verification of Appointment  2022     Seen today: yes    Patient:  Paul Loera  :   1995  MRN:     [id number]  Physician: ELDA JOSHI    Paul Loera may not return to work until she is reevaluated on 2022.  This patient has some limitations of her left hand which keep her from lifting anything heavy.  We will have her work with therapy and see her again in 2 weeks.    Sincerely,                Electronically signed by Elda Joshi MD

## 2022-02-11 NOTE — NURSING NOTE
Reason For Visit:   Chief Complaint   Patient presents with     Surgical Followup     2 wk post-op left wrist masses excision DOS 1/27/22 // discuss numbness        There were no vitals taken for this visit.    Pain Assessment  Patient Currently in Pain: No (no pain but numbness)      Neha Boggs ATC

## 2022-02-11 NOTE — LETTER
2/11/2022         RE: Paul Loera  [address]  [facility] [address]        Dear Colleague,    Thank you for referring your patient, Paul Loera, to the [facility] [place]. Please see a copy of my visit note below.    This 26-year-old woman recently had a recurrent tenosynovial giant cell tumor excised from her wrist.     I did a video visit with this patient between 10:28 AM and 10:38 AM today.    She is complaining of numbness in her thumb index and long finger on the left hand both on the volar and dorsal side.  She has difficulty gripping things.  Is not clear if this is just from the lack of sensation or she also has weakness in that area.  Her incision is doing well and there is no pain.    Given that we are operating on the radial aspect of her wrist it seems unlikely that she has a median nerve injury but this could be a traction injury to the radial nerve or less likely the median nerve.  She could also have side effects from local anesthetic injection which might include the median nerve.    What ever the cause we will have her begin with some hand therapy soon as possible.  She will return to see me in 2 weeks for an examination.  She has to do a lot of heavy lifting with her work and states that she needs to be 100% before returning to work.  I will write her a note to give her additional time off as she is clearly not ready to do her usual work demands.          Again, thank you for allowing me to participate in the care of your patient.        Sincerely,        Pankaj Guevara MD

## 2022-02-11 NOTE — PROGRESS NOTES
This 26-year-old woman recently had a recurrent tenosynovial giant cell tumor excised from her wrist.     I did a video visit with this patient between 10:28 AM and 10:38 AM today.    She is complaining of numbness in her thumb index and long finger on the left hand both on the volar and dorsal side.  She has difficulty gripping things.  Is not clear if this is just from the lack of sensation or she also has weakness in that area.  Her incision is doing well and there is no pain.    Given that we are operating on the radial aspect of her wrist it seems unlikely that she has a median nerve injury but this could be a traction injury to the radial nerve or less likely the median nerve.  She could also have side effects from local anesthetic injection which might include the median nerve.    What ever the cause we will have her begin with some hand therapy soon as possible.  She will return to see me in 2 weeks for an examination.  She has to do a lot of heavy lifting with her work and states that she needs to be 100% before returning to work.  I will write her a note to give her additional time off as she is clearly not ready to do her usual work demands.

## 2022-02-11 NOTE — LETTER
Date:February 11, 2022      Patient was self referred, no letter generated. Do not send.        [facility] Health Information

## 2022-02-16 ENCOUNTER — THERAPY VISIT (OUTPATIENT)
Dept: OCCUPATIONAL THERAPY | Facility: CLINIC | Age: 27
End: 2022-02-16
Attending: ORTHOPAEDIC SURGERY
Payer: COMMERCIAL

## 2022-02-16 DIAGNOSIS — D48.19 GIANT CELL TUMOR OF TENDON SHEATH: ICD-10-CM

## 2022-02-16 DIAGNOSIS — R20.0 NUMBNESS AND TINGLING IN LEFT HAND: ICD-10-CM

## 2022-02-16 DIAGNOSIS — R20.2 NUMBNESS AND TINGLING IN LEFT HAND: ICD-10-CM

## 2022-02-16 DIAGNOSIS — Z47.89 AFTERCARE FOLLOWING SURGERY OF THE MUSCULOSKELETAL SYSTEM: ICD-10-CM

## 2022-02-16 PROCEDURE — 97110 THERAPEUTIC EXERCISES: CPT | Mod: GO | Performed by: OCCUPATIONAL THERAPIST

## 2022-02-16 PROCEDURE — 97165 OT EVAL LOW COMPLEX 30 MIN: CPT | Mod: GO | Performed by: OCCUPATIONAL THERAPIST

## 2022-02-16 PROCEDURE — 97112 NEUROMUSCULAR REEDUCATION: CPT | Mod: GO | Performed by: OCCUPATIONAL THERAPIST

## 2022-02-16 NOTE — PROGRESS NOTES
Hand Therapy Initial Evaluation    Current Date:  2/16/2022    Diagnosis: Left wrist 1st dorsal compartment extensor tendon giant cell tumor  DOS: 1/27/22  Procedure:  Excision of left wrist 1st dorsal compartment extensor tendon giant cell tumor  Post:  2w 6d  Referring physician: Pankaj Guevara MD    Precautions: None    Subjective:  Paul Loera is a 26 year old female.    Patient reports symptoms of the right wrist and hand which occurred due to the above procedure. Of note, the patient has a history of left wrist tenosynovial giant cell tumor with recurrence. Her last surgery was about three years ago. Since onset symptoms are unchanged  General health as reported by patient is good.  Pertinent medical history includes:  Past Medical History:   Diagnosis Date     Anemia      Ankle impingement syndrome, left      Central loss of vision, left      Giant cell tumor of tendon sheath      PONV (postoperative nausea and vomiting)      Tenosynovitis     left achilles     Uncomplicated asthma      Past Surgical History:   Procedure Laterality Date     ARTHROSCOPY HIP, CORRECT IMPINGEMENT FEMOROACETABULAR, REPAIR TEAR LABRAL, COMBINED Left 01/01/2010     EXCISE MASS UPPER EXTREMITY Left 1/27/2022    Procedure: Excision left wrist masses;  Surgeon: Pankaj Guevara MD;  [place] [facility] [place]     EXCISE MASS WRIST Left 10/27/2016    Procedure: EXCISE MASS WRIST;  Surgeon: Pankaj Guevara MD;  Location:  OR     EXCISE MASS WRIST Left 09/27/2018    Procedure: EXCISE MASS WRIST;  Excision Left Wrist Masses;  Surgeon: Pankaj Guevara MD;  Location:  OR     EYE SURGERY Left     X3 for retinal injury     GALLBLADDER SURGERY  01/01/2014     LENGTHEN TENDON ACHILLES Left 09/23/2015    Procedure: LENGTHEN TENDON ACHILLES;  Surgeon: Chandana Bunch MD;  [facility]     OPEN PARTIAL TALUS EXCISION Left 03/16/2016    MD Julio C     SHOULDER SURGERY Right 2011, 2012, 2013     [facility]  PELVIS/HIP JOINT SURGERY UNLISTED       [facility] SHOULDER SURG PROC UNLISTED       [facility] STOMACH SURGERY PROCEDURE UNLISTED       Current Outpatient Medications   Medication     acetaminophen (TYLENOL) 325 MG tablet     CEPHALEXIN PO     Ferrous Gluconate 240 (27 Fe) MG TABS     ibuprofen (ADVIL/MOTRIN) 600 MG tablet     oxyCODONE (ROXICODONE) 5 MG tablet     senna-docusate (SENOKOT-S;PERICOLACE) 8.6-50 MG per tablet     No current facility-administered medications for this visit.    No Known Allergies    Occupational Profile Information:  Right hand dominant  Current occupation is a  for [employer]  Job Tasks: Lifting, Carrying, Operating a Machine, Assembly, Pushing, Pulling, Repetitive Tasks  Currently not working due to present treatment problem  Prior functional level:  no limitations  Mobility: No difficulty  Transportation: drives  Barriers include:none  Leisure activities/hobbies: Taking care of dogs    Patient reports symptoms of stiffness/loss of motion, weakness/loss of strength, numbness and tingling   Special tests:  MRI.    Previous treatment: None        Functional Outcome Measure:   Upper Extremity Functional Index Score:  SCORE: 38/80   (A lower score indicates greater disability.)          Objective:  Pain Level (Scale 0-10)   2/16/2022   At Rest 0/10   With Use 0/10     Scar  Hypertrophic, mildly adhered.    Edema  Mild of radial aspect of left wrist compared to right.    Sensation   Patient reports numbness and tingling within the superficial radial nerve distribution. Sensation within the median and ulnar nerve distributions is normal per patient report.    ROM  Pain Report: - none  + mild    ++ moderate    +++ severe   Wrist 2/16/2022 2/16/2022   AROM (PROM) Right Left   Extension 80 70   Flexion 87 60   RD NT 25   UD NT 10   Supination WNL WNL   Pronation WNL WNL   Fingers- Lacks terminal extension at the PIP joints. Able to make a loose fist and abduct/adduct fingers.  Thumb- Able to flex and  extend the thumb, although mildly limited. Opposes thumb to small finger MCP joint.      Strength   (Measured in pounds, pain-free)  Pain Report: - none  + mild    ++ moderate    +++ severe    2/16/2022 2/16/2022   Trials Right Left   1  2 85  85 10  13   Average 85 11.5     Lat Pinch 2/16/2022 2/16/2022   Trials Right Left   1 22 8     3 Pt Pinch 2/16/2022 2/16/2022   Trials Right Left   1 16 5         Assessment:  Patient presents with symptoms consistent with diagnosis of the above condition,  with surgical  intervention.     Patient's limitations or Problem List includes:  Decreased ROM/motion, Increased edema, Sensory disturbance, Adherent scarring, Decreased  and Decreased pinch of the left wrist, hand and thumb which interferes with the patient's ability to perform Self Care Tasks (dressing, eating, bathing, hygiene/toileting), Work Tasks, Recreational Activities, Household Chores and Driving  as compared to previous level of function.    Rehab Potential:  Excellent - Return to full activity, no limitations    Patient will benefit from skilled Occupational Therapy to increase ROM,  strength, pinch strength and sensation and decrease pain, edema and adherence of scarring to return to previous activity level and resume normal daily tasks and to reach their rehab potential.    Barriers to Learning:  No barrier    Communication Issues:  Patient appears to be able to clearly communicate and understand verbal and written communication and follow directions correctly.    Chart Review: Chart Review    Identified Performance Deficits: bathing/showering, toileting, dressing, feeding, hygiene and grooming, care of pets, driving and community mobility, home establishment and management, meal preparation and cleanup, shopping, work and leisure activities    Assessment of Occupational Performance:  1-3 Performance Deficits    Clinical Decision Making (Complexity): Low complexity    Treatment Explanation:  The  following has been discussed with the patient:  RX ordered/plan of care  Anticipated outcomes  Possible risks and side effects    Plan:  Frequency:  1 X week, once daily  Duration:  for 6 weeks    Treatment Plan:   Modalities:  US and Paraffin  Therapeutic Exercise:  AROM, AAROM, PROM, Tendon Gliding, Blocking, Extensor Tracking, Isotonics and Isometrics  Neuromuscular re-education:  Nerve Gliding and Kinesiotaping  Manual Techniques:  Joint mobilization, Scar mobilization, Friction massage and Myofascial release  Orthotic Fabrication:  As indicated    Discharge Plan:  Achieve all LTG.  Independent in home treatment program.  Reach maximal therapeutic benefit.    Home Exercise Program:  Wrist, hand, and thumb AROM  Radial nerve gliding    Next Visit:  Progress nerve gliding  Try RN kinesiotaping  Progress to strengthening, when appropriate

## 2022-02-25 ENCOUNTER — OFFICE VISIT (OUTPATIENT)
Dept: ORTHOPEDICS | Facility: CLINIC | Age: 27
End: 2022-02-25
Payer: COMMERCIAL

## 2022-02-25 ENCOUNTER — THERAPY VISIT (OUTPATIENT)
Dept: OCCUPATIONAL THERAPY | Facility: CLINIC | Age: 27
End: 2022-02-25
Payer: COMMERCIAL

## 2022-02-25 DIAGNOSIS — Z47.89 AFTERCARE FOLLOWING SURGERY OF THE MUSCULOSKELETAL SYSTEM: ICD-10-CM

## 2022-02-25 DIAGNOSIS — R20.2 NUMBNESS AND TINGLING IN LEFT HAND: ICD-10-CM

## 2022-02-25 DIAGNOSIS — R20.0 NUMBNESS AND TINGLING IN LEFT HAND: ICD-10-CM

## 2022-02-25 DIAGNOSIS — D48.19 GIANT CELL TUMOR OF TENDON SHEATH: Primary | ICD-10-CM

## 2022-02-25 PROCEDURE — 97110 THERAPEUTIC EXERCISES: CPT | Mod: GO | Performed by: OCCUPATIONAL THERAPIST

## 2022-02-25 PROCEDURE — 99024 POSTOP FOLLOW-UP VISIT: CPT | Performed by: ORTHOPAEDIC SURGERY

## 2022-02-25 PROCEDURE — 97760 ORTHOTIC MGMT&TRAING 1ST ENC: CPT | Mod: GO | Performed by: OCCUPATIONAL THERAPIST

## 2022-02-25 NOTE — PROGRESS NOTES
This patient has a well-healed incision.  She still has some numbness over the dorsum of the first webspace extending on to the proximal half of the index and long fingers.  She is concerned about doing heavy lifting at work but would like to return in 1 week at light duty.  I will have her do light duty for 3 weeks before resuming her usual duties.  I have answered all her questions today.  She will return to see me as needed for any new masses in the wrist.

## 2022-02-25 NOTE — PROGRESS NOTES
SOAP note objective information for 2/25/2022.  Please refer to the daily flowsheet for treatment today, total treatment time and time spent performing 1:1 timed codes.     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    Edema  Mild of radial aspect of left wrist compared to right.    Sensation   Patient reports numbness and tingling within the superficial radial nerve distribution. Sensation within the median and ulnar nerve distributions is normal per patient report.    ROM  Pain Report: - none  + mild    ++ moderate    +++ severe   Wrist 2/16/2022 2/16/2022 2/25/2022   AROM (PROM) Right Left Left   Extension 80 70 80   Flexion 87 60 75   RD NT 25 NT   UD NT 10 NT   Supination WNL WNL NT   Pronation WNL WNL NT   2/16/2022- Lacks terminal extension at the PIP joints. Able to make a loose fist and abduct/adduct fingers.  2/25/2022- Able to make a full fist.    Thumb 2/25/2022 2/25/2022   AROM  (PROM) Right Left   MP 0/57 0/45   IP 0/85 0/70   RABD 55 45   PABD 65 59   Kapandji Opposition Scale (0-10/10) Small finger distal palmar crease Small finger distal palmar crease         Strength   (Measured in pounds, pain-free)  Pain Report: - none  + mild    ++ moderate    +++ severe    2/16/2022 2/16/2022   Trials Right Left   1  2 85  85 10  13   Average 85 11.5     Lat Pinch 2/16/2022 2/16/2022   Trials Right Left   1 22 8     3 Pt Pinch 2/16/2022 2/16/2022   Trials Right Left   1 16 5         Home Exercise Program:  Wrist, hand, and thumb AROM   strengthening  Radial nerve gliding  Thumb spica orthosis, wear as-needed    Next Visit:  Progress nerve gliding  Try RN  kinesiotaping

## 2022-02-25 NOTE — LETTER
2/25/2022         RE: Paul Loera  [address]  [facility] [address]        Dear Colleague,    Thank you for referring your patient, Paul Loera, to the [facility] [place]. Please see a copy of my visit note below.    This patient has a well-healed incision.  She still has some numbness over the dorsum of the first webspace extending on to the proximal half of the index and long fingers.  She is concerned about doing heavy lifting at work but would like to return in 1 week at light duty.  I will have her do light duty for 3 weeks before resuming her usual duties.  I have answered all her questions today.  She will return to see me as needed for any new masses in the wrist.      Again, thank you for allowing me to participate in the care of your patient.        Sincerely,        Pankaj Guevara MD

## 2022-02-25 NOTE — NURSING NOTE
Reason For Visit:   Chief Complaint   Patient presents with     RECHECK     followup left wrist masses excision DOS 1/27/22 // still numb // been doing hand therapy        There were no vitals taken for this visit.    Pain Assessment  Patient Currently in Pain: Denies (no pain // just tingling and numbness per pt)    Neha Boggs, ATC

## 2022-02-25 NOTE — LETTER
Date:February 25, 2022      Provider requested that no letter be sent. Do not send.       [facility]

## 2022-02-25 NOTE — LETTER
Return to Work  2022     Seen today: yes    Patient:  Paul Loera  :   1995  MRN:     [id number]  Physician: ELDA JOSHI    Paul Loera may return to work on Date: 2022.    She will continue hand therapy for for the time being    Patient limitations: Beginning March fourth and for 3 weeks thereafter the patient's lifting should be limited to 10 pounds or less.    Sincerely,              Electronically signed by Elda Joshi MD

## 2022-03-01 ENCOUNTER — THERAPY VISIT (OUTPATIENT)
Dept: OCCUPATIONAL THERAPY | Facility: CLINIC | Age: 27
End: 2022-03-01
Payer: COMMERCIAL

## 2022-03-01 DIAGNOSIS — Z47.89 AFTERCARE FOLLOWING SURGERY OF THE MUSCULOSKELETAL SYSTEM: ICD-10-CM

## 2022-03-01 DIAGNOSIS — R20.0 NUMBNESS AND TINGLING IN LEFT HAND: ICD-10-CM

## 2022-03-01 DIAGNOSIS — R20.2 NUMBNESS AND TINGLING IN LEFT HAND: ICD-10-CM

## 2022-03-01 PROCEDURE — 97112 NEUROMUSCULAR REEDUCATION: CPT | Mod: GO | Performed by: OCCUPATIONAL THERAPIST

## 2022-03-01 PROCEDURE — 97110 THERAPEUTIC EXERCISES: CPT | Mod: GO | Performed by: OCCUPATIONAL THERAPIST

## 2022-03-01 NOTE — PROGRESS NOTES
SOAP note objective information for 3/1/2022.  Please refer to the daily flowsheet for treatment today, total treatment time and time spent performing 1:1 timed codes.       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    Edema  Mild of radial aspect of left wrist compared to right.    Sensation   Patient reports numbness and tingling within the superficial radial nerve distribution. Sensation within the median and ulnar nerve distributions is normal per patient report.    ROM  Pain Report: - none  + mild    ++ moderate    +++ severe   Wrist 2/16/2022 2/16/2022 2/25/2022   AROM (PROM) Right Left Left   Extension 80 70 80   Flexion 87 60 75   RD NT 25 NT   UD NT 10 NT   Supination WNL WNL NT   Pronation WNL WNL NT   2/16/2022- Lacks terminal extension at the PIP joints. Able to make a loose fist and abduct/adduct fingers.  2/25/2022- Able to make a full fist.    Thumb 2/25/2022 2/25/2022   AROM  (PROM) Right Left   MP 0/57 0/45   IP 0/85 0/70   RABD 55 45   PABD 65 59   Kapandji Opposition Scale (0-10/10) Small finger distal palmar crease Small finger distal palmar crease         Strength   (Measured in pounds, pain-free)  Pain Report: - none  + mild    ++ moderate    +++ severe    2/16/2022 2/16/2022   Trials Right Left   1  2 85  85 10  13   Average 85 11.5     Lat Pinch 2/16/2022 2/16/2022   Trials Right Left   1 22 8     3 Pt Pinch 2/16/2022 2/16/2022   Trials Right Left   1 16 5         Home Exercise Program:  Wrist, hand, and thumb AROM   strengthening  Radial nerve gliding  Thumb spica orthosis, wear as-needed    Next Visit:  Progress nerve gliding  Try RN kinesiotaping

## 2022-05-15 ENCOUNTER — HEALTH MAINTENANCE LETTER (OUTPATIENT)
Age: 27
End: 2022-05-15

## 2022-07-20 PROBLEM — Z47.89 AFTERCARE FOLLOWING SURGERY OF THE MUSCULOSKELETAL SYSTEM: Status: RESOLVED | Noted: 2022-02-16 | Resolved: 2022-07-20

## 2022-07-20 PROBLEM — R20.0 NUMBNESS AND TINGLING IN LEFT HAND: Status: RESOLVED | Noted: 2022-02-16 | Resolved: 2022-07-20

## 2022-07-20 PROBLEM — R20.2 NUMBNESS AND TINGLING IN LEFT HAND: Status: RESOLVED | Noted: 2022-02-16 | Resolved: 2022-07-20

## 2022-09-11 ENCOUNTER — HEALTH MAINTENANCE LETTER (OUTPATIENT)
Age: 27
End: 2022-09-11

## 2022-10-27 ENCOUNTER — TRANSCRIBE ORDERS (OUTPATIENT)
Dept: VASCULAR SURGERY | Facility: CLINIC | Age: 27
End: 2022-10-27

## 2022-10-27 DIAGNOSIS — M79.606 LEG PAIN: Primary | ICD-10-CM

## 2022-10-28 DIAGNOSIS — M79.89 LEG SWELLING: ICD-10-CM

## 2022-10-28 DIAGNOSIS — M79.605 PAIN OF LEFT LOWER EXTREMITY: Primary | ICD-10-CM

## 2022-11-04 ENCOUNTER — HOSPITAL ENCOUNTER (OUTPATIENT)
Dept: CT IMAGING | Facility: CLINIC | Age: 27
Discharge: HOME | End: 2022-11-04
Attending: RADIOLOGY | Admitting: RADIOLOGY
Payer: COMMERCIAL

## 2022-11-04 DIAGNOSIS — M79.605 PAIN OF LEFT LOWER EXTREMITY: ICD-10-CM

## 2022-11-04 DIAGNOSIS — M79.89 LEG SWELLING: ICD-10-CM

## 2022-11-04 PROCEDURE — 74174 CTA ABD&PLVS W/CONTRAST: CPT

## 2022-11-04 PROCEDURE — 250N000011 HC RX IP 250 OP 636: Performed by: RADIOLOGY

## 2022-11-04 RX ORDER — IOPAMIDOL 755 MG/ML
100 INJECTION, SOLUTION INTRAVASCULAR ONCE
Status: COMPLETED | OUTPATIENT
Start: 2022-11-04 | End: 2022-11-04

## 2022-11-04 RX ADMIN — IOPAMIDOL 100 ML: 755 INJECTION, SOLUTION INTRAVENOUS at 19:52

## 2022-11-15 ENCOUNTER — ANCILLARY PROCEDURE (OUTPATIENT)
Dept: VASCULAR ULTRASOUND | Facility: CLINIC | Age: 27
End: 2022-11-15
Attending: RADIOLOGY
Payer: COMMERCIAL

## 2022-11-15 ENCOUNTER — OFFICE VISIT (OUTPATIENT)
Dept: VASCULAR SURGERY | Facility: CLINIC | Age: 27
End: 2022-11-15
Attending: STUDENT IN AN ORGANIZED HEALTH CARE EDUCATION/TRAINING PROGRAM
Payer: COMMERCIAL

## 2022-11-15 VITALS — HEART RATE: 64 BPM | SYSTOLIC BLOOD PRESSURE: 124 MMHG | RESPIRATION RATE: 15 BRPM | DIASTOLIC BLOOD PRESSURE: 60 MMHG

## 2022-11-15 DIAGNOSIS — M79.89 LEG SWELLING: ICD-10-CM

## 2022-11-15 DIAGNOSIS — M79.605 PAIN OF LEFT LOWER EXTREMITY: ICD-10-CM

## 2022-11-15 DIAGNOSIS — M79.606 LEG PAIN: ICD-10-CM

## 2022-11-15 PROCEDURE — 93971 EXTREMITY STUDY: CPT | Mod: LT

## 2022-11-15 PROCEDURE — G0463 HOSPITAL OUTPT CLINIC VISIT: HCPCS

## 2022-11-15 NOTE — PROGRESS NOTES
VASCULAR AND INTERVENTIONAL OUTPATIENT CONSULT OR VISIT  PHYSICIAN: Aixa Guerrero MD  NEW PATIENT     LOCATION: [facility]    Paul Loera   Medical Record #:  [id number]  YOB: 1995  Age:  27 year old     Date of Service: 11/15/2022    PRIMARY CARE PROVIDER: No Ref-Primary, Physician    Reason for visit:  Left lower extremity swelling    IMPRESSION: 27-year-old female with intermittent left lower extremity swelling of unclear etiology.  The patient underwent a CT venogram of the abdomen and pelvis which does not demonstrate any significant compression of the left common iliac vein.  The patient underwent a left lower extremity venous ultrasound and there is no deep vein thrombosis.  There are phasic waveforms in the left common femoral vein with both inspiration and expiration.      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  Repeat ultrasound performed today demonstrates no evidence of left lower extremity venous insufficiency in the greater or small saphenous veins.        RECOMMENDATION: We will plan for a left lower extremity/pelvic venogram with intravascular ultrasound imaging to evaluate for potential May Thurner syndrome.    HPI:  Paul Loera is a 27 year old female who was evaluated today for for left lower extremity swelling.  The patient reports her symptoms have been present for the past few years and have been gradually progressive.  The patient reports gradual, progressive swelling of her left lower extremity throughout the day which initially begins in the calf and then spreads to the thigh.  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  The patient reports relief of the lower extremity swelling upon elevating her left lower extremity for 30-40 minutes.  The patient denies any significant injury to the left lower extremity or history of venous thromboembolism.  No lower extremity wounds or  ulcerations.    PHH:    Past Medical History:   Diagnosis Date     Anemia      Ankle impingement syndrome, left      Central loss of vision, left      Giant cell tumor of tendon sheath      PONV (postoperative nausea and vomiting)      Tenosynovitis     left achilles     Uncomplicated asthma         Past Surgical History:   Procedure Laterality Date     ARTHROSCOPY HIP, CORRECT IMPINGEMENT FEMOROACETABULAR, REPAIR TEAR LABRAL, COMBINED Left 01/01/2010     EXCISE MASS UPPER EXTREMITY Left 1/27/2022    Procedure: Excision left wrist masses;  Surgeon: Pankaj Guevara MD;  Location: [facility] OR     EXCISE MASS WRIST Left 10/27/2016    Procedure: EXCISE MASS WRIST;  Surgeon: Pankaj Guevara MD;  [facility]     EXCISE MASS WRIST Left 09/27/2018    Procedure: EXCISE MASS WRIST;  Excision Left Wrist Masses;  Surgeon: Pankaj Guevara MD;  Location: [facility] OR     EYE SURGERY Left     X3 for retinal injury     GALLBLADDER SURGERY  01/01/2014     LENGTHEN TENDON ACHILLES Left 09/23/2015    Procedure: LENGTHEN TENDON ACHILLES;  Surgeon: Chandana Bunch MD;  [facility]     OPEN PARTIAL TALUS EXCISION Left 03/16/2016    MD Julio C     SHOULDER SURGERY Right 2011, 2012, 2013     [facility] PELVIS/HIP JOINT SURGERY UNLISTED       [facility] SHOULDER SURG PROC UNLISTED       [facility] STOMACH SURGERY PROCEDURE UNLISTED         ALLERGIES:  Patient has no known allergies.    MEDS:    Current Outpatient Medications:      acetaminophen (TYLENOL) 325 MG tablet, Take 2 tablets (650 mg) by mouth every 4 hours as needed for other (mild pain) (Patient not taking: Reported on 11/15/2022), Disp: 60 tablet, Rfl: 1     CEPHALEXIN PO, Take 500 mg by mouth 2 times daily  (Patient not taking: Reported on 11/15/2022), Disp: , Rfl:      Ferrous Gluconate 240 (27 Fe) MG TABS, Take 240 mg by mouth every evening  (Patient not taking: Reported on 11/15/2022), Disp: , Rfl:      ibuprofen (ADVIL/MOTRIN) 600 MG tablet, Take 1 tablet (600  mg) by mouth every 6 hours as needed for pain (mild) (Patient not taking: Reported on 11/15/2022), Disp: 40 tablet, Rfl: 0     oxyCODONE (ROXICODONE) 5 MG tablet, Take 1-2 tablets (5-10 mg) by mouth every 4 hours as needed for pain or moderate to severe pain (Moderate to Severe) (Patient not taking: Reported on 11/15/2022), Disp: 25 tablet, Rfl: 0     senna-docusate (SENOKOT-S;PERICOLACE) 8.6-50 MG per tablet, Take 1-2 tablets by mouth 2 times daily as needed for constipation Take while on oral narcotics to prevent or treat constipation. (Patient not taking: Reported on 11/15/2022), Disp: 30 tablet, Rfl: 1    SOCIAL HABITS:    History   Smoking Status     Never   Smokeless Tobacco     Never     Social History    Substance and Sexual Activity      Alcohol use: Yes        Comment: occasional       History   Drug Use     Types: Marijuana     Comment: occ       FAMILY HISTORY:    Family History   Problem Relation Age of Onset     Other Cancer Paternal Grandmother        ADVANCE CARE DIRECTIVES:    Advance care directives reviewed in the chart and no changes made.     PE:  /60   Pulse 64   Resp 15   Wt Readings from Last 1 Encounters:   01/27/22 154 lb (69.9 kg)     There is no height or weight on file to calculate BMI.    EXAM:  GENERAL: This is a well-developed 27 year old female who appears her stated age  EYES: Grossly normal.  MOUTH: Buccal mucosa normal   MUSCULOSKELETAL: Grossly normal and both lower extremities are intact.  HEME/LYMPH: No lymphedema, no leg swelling or pitting edema noted  NEUROLOGIC: Focally intact, Alert and oriented x 3.   PSYCH: appropriate affect  INTEGUMENT: No open lesions or ulcers    DIAGNOSTIC STUDIES:     Images:  CTV Abdomen Pelvis w Contrast    Result Date: 11/5/2022  EXAM: CTV ABDOMEN PELVIS W CONTRAST LOCATION: [facility] DATE/TIME: 11/4/2022 7:51 PM INDICATION: CT venogram of the abd pelvis   rule out May Thurners COMPARISON: None. TECHNIQUE: CT  scan of the abdomen and pelvis was performed following injection of IV contrast. Multiplanar reformats were obtained. Dose reduction techniques were used. CONTRAST: FINDINGS: LOWER CHEST: Normal. HEPATOBILIARY: Normal. PANCREAS: Normal. SPLEEN: Normal. ADRENAL GLANDS: Normal. KIDNEYS/BLADDER: Nonobstructing calculus in the upper pole right kidney. BOWEL: Normal. LYMPH NODES: Normal. VASCULATURE: Normal. No compression of the left common iliac vein, thrombosis, or pelvic collateral vessels. PELVIC ORGANS: Normal. MUSCULOSKELETAL: Normal.     IMPRESSION: 1.  No evidence of May Thurner syndrome.      LABS:      Sodium   Date Value Ref Range Status   12/29/2016 143 133 - 144 mmol/L Final   10/25/2016 142 133 - 144 mmol/L Final     Urea Nitrogen   Date Value Ref Range Status   12/29/2016 9 7 - 30 mg/dL Final   10/25/2016 12 7 - 30 mg/dL Final     Hemoglobin   Date Value Ref Range Status   10/25/2016 11.1 (L) 11.7 - 15.7 g/dL Final   02/11/2016 11.3 (L) 11.7 - 15.7 g/dL Final   09/03/2015 11.0 (L) 11.7 - 15.7 g/dL Final     Platelet Count   Date Value Ref Range Status   10/25/2016 247 150 - 450 10e9/L Final   02/11/2016 211 150 - 450 10e9/L Final   09/03/2015 188 150 - 450 10e9/L Final       This was a in person visit in which 60 minutes of  total time was spent (either in face-to-face or non-face-to-face time).    Aixa Guerrero MD, [facility]  VASCULAR AND INTERVENTIONAL PHYSICIAN  VASCULAR MEDICINE  INTERNAL MEDICINE  PAGER: [phone]  CALL SERVICE: [phone]

## 2022-11-15 NOTE — NURSING NOTE
[facility]        Patient is here for a consult to discuss L leg swelling. Pt states that leg swelling will occur after work. 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 The swelling will start at her thigh and go down her leg. She has worn compression with minimal help.  She states to relieve the swelling she has to lay on the ground and put her legs on the wall.    Pt is currently taking no meds that would impact our treatment plan.    There were no vitals taken for this visit.    The provider has been notified that the patient has no concerns.     Questions patient would like addressed today are: N/A.    Refills are needed: N/A    Has homecare services and agency name:  Rocio Holman RN

## 2022-11-17 ENCOUNTER — TELEPHONE (OUTPATIENT)
Dept: VASCULAR SURGERY | Facility: CLINIC | Age: 27
End: 2022-11-17

## 2022-11-17 DIAGNOSIS — M79.606 LEG PAIN: Primary | ICD-10-CM

## 2022-11-17 NOTE — TELEPHONE ENCOUNTER
LMTCB need to schedule venogram with Dr. Guerrero with [facility].     Tentative date 12/1 at 10 at [place]. Need to schedule a follow-up 3-4 weeks later, no imaging.

## 2022-11-18 NOTE — TELEPHONE ENCOUNTER
Writer spoke with pt, schedule for date and time below. Mo scheduled in IR. [facility] was updated by Radha LYONS to be present. COVID test scheduled for pt. AVS will be mailed with all instructions. She was updated she will need a , NPO 8 hours prior, clear liquids up to 1 hour prior.

## 2022-11-28 NOTE — TELEPHONE ENCOUNTER
Spoke with patient.  Was seen in [facility] on 11/20, covid test was positive (confirmed with care everywhere).  Pre procedure covid test appointment cancelled.  Routing to patient navigator for rescheduling.

## 2022-11-28 NOTE — TELEPHONE ENCOUNTER
Patient states she tested positive for COVID last week Tues; she is scheduled for pre op COVID test tomorrow and venogram later this week. [phone]

## 2022-11-29 NOTE — TELEPHONE ENCOUNTER
Rescheduled venogram to 12/8/22 8:00AM with pt via telephone.  Confirmed 7AM arrival time with pt.

## 2022-12-07 ENCOUNTER — TELEPHONE (OUTPATIENT)
Dept: INTERVENTIONAL RADIOLOGY/VASCULAR | Facility: CLINIC | Age: 27
End: 2022-12-07

## 2022-12-07 NOTE — PROGRESS NOTES
Interventional Radiology - Pre-Procedure Note:  12/7/2022    Procedure Requested: LEFT lower extremity venogram  Requested by: Aixa Guerrero MD    History and Physical Reviewed: H&P documented within 30 days (by Pelon Guerrero MD on 11/20/2022). I have personally reviewed the patient's medical history and have updated the medical record as necessary.    Brief HPI: Paul Loera is a 27 year old female with a PMH of anemia, asthma, and giant cell tumor of tendon sheath who presents for a LEFT lower extremity venogram. Patient has had gradually progressive swelling of her LLE over the past few years. She has daily episodes of tightness, swelling, and tingling over her thigh that radiates down her foot. Originally presented to Dr. Pineda on 10/27/2022, who referred her to Dr. Guerrero with IR. She underwent CT venogram on 11/04/2022 which demonstrated no evidence of May Thurner, see imaging below. LLE US also demonstrated no evidence of DVT, see imaging below. Recommending LLE/pelvic venogram with intravascular US imaging to evaluate for potential May Thurner.    Of note, Hgb this AM is 8.3. She does have a history of anemia, but does not know what her Hgb normally runs. Last known Hgb was in 2016, 11.1. She has a prescription for iron, but has not taken it for about a year. States anemia runs in her family. Used to follow with providers at the [facility], but does not currently have a PCP. States she is fatigued often, but attributes that to working long shifts. Denies dizziness or lightheadedness.     IMAGING:  US Lower Extremity Venous Duplex Left 11/15/2022  LEFT Venous Ultrasound (Date: 11/15/22)    LEFT Lower Extremity          Indication: Left leg intermittent swelling/pain     Previous: 11/04/2022-CTV     Patient History Includes: None     Left Leg Venous Doppler     Location CIV   Spontaneous +   Phasic  +   Patent +         Location CFV SPJ SFV/PFV PROX SFV MID SFV DIST POP   V. PERONEAL V. PTV'S    Compressibility  (FC,PC,NC) FC FC FC FC FC FC FC FC   Spontaneous + + + + + + + +   Phasic  + + + + + + + +   Augmentation + + + + + + + +   Patent + + + + + + + +       Impression: No evidence of Deep Vein Thrombus        Reference:     Compressibility: FC= Fully compressible, PC= Partially compressible, NC= Non-compressible, NV= Not Visualized     Venous Doppler: (+) = Present  (0) = Absent  (-) = Decreased/Unable to Evaluate, (NV) = Not Visualized    CTV Abdomen Pelvis w Contrast 11/04/2022  EXAM: CTV ABDOMEN PELVIS W CONTRAST  LOCATION: [facility]  DATE/TIME: 11/4/2022 7:51 PM     INDICATION: CT venogram of the abd pelvis   rule out May Thurners  COMPARISON: None.  TECHNIQUE: CT scan of the abdomen and pelvis was performed following injection of IV contrast. Multiplanar reformats were obtained. Dose reduction techniques were used.  CONTRAST:     FINDINGS:   LOWER CHEST: Normal.     HEPATOBILIARY: Normal.     PANCREAS: Normal.     SPLEEN: Normal.     ADRENAL GLANDS: Normal.     KIDNEYS/BLADDER: Nonobstructing calculus in the upper pole right kidney.     BOWEL: Normal.     LYMPH NODES: Normal.     VASCULATURE: Normal. No compression of the left common iliac vein, thrombosis, or pelvic collateral vessels.     PELVIC ORGANS: Normal.     MUSCULOSKELETAL: Normal.                                                                      IMPRESSION:   1.  No evidence of May Thurner syndrome.    NPO: Midnight  ANTICOAGULANTS: None  ANTIBIOTICS: Not needed    ALLERGIES  No Known Allergies      LABS:  INR   Date Value Ref Range Status   12/08/2022 1.08 0.85 - 1.15 Final      Hemoglobin   Date Value Ref Range Status   12/08/2022 8.3 (L) 11.7 - 15.7 g/dL Final   10/25/2016 11.1 (L) 11.7 - 15.7 g/dL Final     Platelet Count   Date Value Ref Range Status   12/08/2022 295 150 - 450 10e3/uL Final   10/25/2016 247 150 - 450 10e9/L Final     Creatinine   Date Value Ref Range Status   12/08/2022 0.77 0.51 -  0.95 mg/dL Final   12/29/2016 0.80 0.52 - 1.04 mg/dL Final     Potassium   Date Value Ref Range Status   12/29/2016 4.0 3.4 - 5.3 mmol/L Final         EXAM:  /74   Pulse 67   Temp 98.2  F (36.8  C) (Oral)   Resp 16   Wt 72.6 kg (160 lb)   SpO2 99%   BMI 21.91 kg/m    General:  Stable.  In no acute distress.    Neuro:  A&O x 3. Moves all extremities equally.  Resp:  Lungs clear to auscultation bilaterally.  Cardio:  S1S2 and reg, without murmur, clicks or rubs  Vascular:  LLE appears swollen. RLE WNL.  Skin:  Without excoriations, ecchymosis, erythema, lesions or open sores.    Pre-Sedation Assessment:  Mallampati Airway Classification:  I - Faucial pillars, soft palate, and uvula are visible  Previous reaction to anesthesia/sedation:  No  Sedation plan based on assessment: Moderate (conscious) sedation  ASA Classification: Class 2 - MILD SYSTEMIC DISEASE, NO ACUTE PROBLEMS, NO FUNCTIONAL LIMITATIONS.   Code Status: FULL CODE      ASSESSMENT/PLAN:   Regarding her anemia, I instructed her to start taking her iron, and to make an appointment with a PCP to closely monitor her Hgb. She is in agreement with this plan. I also gave her a note for work for tomorrow - she should perform light duty activities for one day, then can resume normal activities.    LEFT lower extremity venogram with possible intervention with sedation.    Procedure, risks/benefits, details, alternatives, and sedation reviewed with patient and patient verbalized understanding. All questions answered. OK to proceed with above radiology procedure.     ANKUR IRIZARRY, HOANG CNP  Interventional Radiology

## 2022-12-08 ENCOUNTER — HOSPITAL ENCOUNTER (OUTPATIENT)
Dept: INTERVENTIONAL RADIOLOGY/VASCULAR | Facility: HOSPITAL | Age: 27
Discharge: HOME | End: 2022-12-08
Attending: RADIOLOGY | Admitting: RADIOLOGY
Payer: COMMERCIAL

## 2022-12-08 VITALS
BODY MASS INDEX: 21.91 KG/M2 | DIASTOLIC BLOOD PRESSURE: 74 MMHG | SYSTOLIC BLOOD PRESSURE: 127 MMHG | OXYGEN SATURATION: 100 % | RESPIRATION RATE: 15 BRPM | WEIGHT: 160 LBS | TEMPERATURE: 97.5 F | HEART RATE: 51 BPM

## 2022-12-08 DIAGNOSIS — M79.606 LEG PAIN: ICD-10-CM

## 2022-12-08 LAB
CREAT SERPL-MCNC: 0.77 MG/DL (ref 0.51–0.95)
GFR SERPL CREATININE-BSD FRML MDRD: >90 ML/MIN/1.73M2
HGB BLD-MCNC: 8.3 G/DL (ref 11.7–15.7)
INR PPP: 1.08 (ref 0.85–1.15)
PLATELET # BLD AUTO: 295 10E3/UL (ref 150–450)

## 2022-12-08 PROCEDURE — C1876 STENT, NON-COA/NON-COV W/DEL: HCPCS

## 2022-12-08 PROCEDURE — 258N000003 HC RX IP 258 OP 636

## 2022-12-08 PROCEDURE — C1769 GUIDE WIRE: HCPCS

## 2022-12-08 PROCEDURE — 272N000302 HC DEVICE INFLATION CR5

## 2022-12-08 PROCEDURE — 250N000011 HC RX IP 250 OP 636

## 2022-12-08 PROCEDURE — 85018 HEMOGLOBIN: CPT

## 2022-12-08 PROCEDURE — 36012 PLACE CATHETER IN VEIN: CPT

## 2022-12-08 PROCEDURE — 250N000013 HC RX MED GY IP 250 OP 250 PS 637: Performed by: RADIOLOGY

## 2022-12-08 PROCEDURE — 37248 TRLUML BALO ANGIOP 1ST VEIN: CPT

## 2022-12-08 PROCEDURE — 255N000002 HC RX 255 OP 636: Performed by: RADIOLOGY

## 2022-12-08 PROCEDURE — 99152 MOD SED SAME PHYS/QHP 5/>YRS: CPT

## 2022-12-08 PROCEDURE — 82565 ASSAY OF CREATININE: CPT

## 2022-12-08 PROCEDURE — 272N000566 HC SHEATH CR3

## 2022-12-08 PROCEDURE — 272N000117 HC CATH CR2

## 2022-12-08 PROCEDURE — 85610 PROTHROMBIN TIME: CPT

## 2022-12-08 PROCEDURE — C1725 CATH, TRANSLUMIN NON-LASER: HCPCS

## 2022-12-08 PROCEDURE — 85049 AUTOMATED PLATELET COUNT: CPT

## 2022-12-08 PROCEDURE — 250N000009 HC RX 250

## 2022-12-08 PROCEDURE — 75820 VEIN X-RAY ARM/LEG: CPT

## 2022-12-08 PROCEDURE — 37238 OPEN/PERQ PLACE STENT SAME: CPT

## 2022-12-08 PROCEDURE — 272N000500 HC NEEDLE CR2

## 2022-12-08 PROCEDURE — 75825 VEIN X-RAY TRUNK: CPT

## 2022-12-08 PROCEDURE — 36415 COLL VENOUS BLD VENIPUNCTURE: CPT

## 2022-12-08 RX ORDER — IODIXANOL 320 MG/ML
100 INJECTION, SOLUTION INTRAVASCULAR ONCE
Status: DISCONTINUED | OUTPATIENT
Start: 2022-12-08 | End: 2022-12-09 | Stop reason: HOSPADM

## 2022-12-08 RX ORDER — ASPIRIN 81 MG/1
81 TABLET ORAL ONCE
Status: COMPLETED | OUTPATIENT
Start: 2022-12-08 | End: 2022-12-08

## 2022-12-08 RX ORDER — NALOXONE HYDROCHLORIDE 0.4 MG/ML
0.4 INJECTION, SOLUTION INTRAMUSCULAR; INTRAVENOUS; SUBCUTANEOUS
Status: DISCONTINUED | OUTPATIENT
Start: 2022-12-08 | End: 2022-12-09 | Stop reason: HOSPADM

## 2022-12-08 RX ORDER — ASPIRIN 81 MG/1
81 TABLET, CHEWABLE ORAL DAILY
Qty: 90 TABLET | Refills: 3 | Status: SHIPPED | OUTPATIENT
Start: 2022-12-08

## 2022-12-08 RX ORDER — LIDOCAINE 40 MG/G
CREAM TOPICAL
Status: DISCONTINUED | OUTPATIENT
Start: 2022-12-08 | End: 2022-12-09 | Stop reason: HOSPADM

## 2022-12-08 RX ORDER — NALOXONE HYDROCHLORIDE 0.4 MG/ML
0.2 INJECTION, SOLUTION INTRAMUSCULAR; INTRAVENOUS; SUBCUTANEOUS
Status: DISCONTINUED | OUTPATIENT
Start: 2022-12-08 | End: 2022-12-09 | Stop reason: HOSPADM

## 2022-12-08 RX ORDER — ACETAMINOPHEN 325 MG/1
975 TABLET ORAL ONCE
Status: COMPLETED | OUTPATIENT
Start: 2022-12-08 | End: 2022-12-08

## 2022-12-08 RX ORDER — FENTANYL CITRATE 50 UG/ML
25-50 INJECTION, SOLUTION INTRAMUSCULAR; INTRAVENOUS EVERY 5 MIN PRN
Status: DISCONTINUED | OUTPATIENT
Start: 2022-12-08 | End: 2022-12-09 | Stop reason: HOSPADM

## 2022-12-08 RX ORDER — FLUMAZENIL 0.1 MG/ML
0.2 INJECTION, SOLUTION INTRAVENOUS
Status: DISCONTINUED | OUTPATIENT
Start: 2022-12-08 | End: 2022-12-09 | Stop reason: HOSPADM

## 2022-12-08 RX ORDER — SCOLOPAMINE TRANSDERMAL SYSTEM 1 MG/1
1 PATCH, EXTENDED RELEASE TRANSDERMAL ONCE
Status: DISCONTINUED | OUTPATIENT
Start: 2022-12-08 | End: 2022-12-09 | Stop reason: HOSPADM

## 2022-12-08 RX ORDER — IODIXANOL 320 MG/ML
100 INJECTION, SOLUTION INTRAVASCULAR ONCE
Status: COMPLETED | OUTPATIENT
Start: 2022-12-08 | End: 2022-12-08

## 2022-12-08 RX ORDER — HEPARIN SODIUM 200 [USP'U]/100ML
1 INJECTION, SOLUTION INTRAVENOUS CONTINUOUS PRN
Status: DISCONTINUED | OUTPATIENT
Start: 2022-12-08 | End: 2022-12-09 | Stop reason: HOSPADM

## 2022-12-08 RX ORDER — ONDANSETRON 2 MG/ML
4 INJECTION INTRAMUSCULAR; INTRAVENOUS ONCE
Status: COMPLETED | OUTPATIENT
Start: 2022-12-08 | End: 2022-12-08

## 2022-12-08 RX ORDER — SODIUM CHLORIDE 9 MG/ML
INJECTION, SOLUTION INTRAVENOUS CONTINUOUS
Status: DISCONTINUED | OUTPATIENT
Start: 2022-12-08 | End: 2022-12-09 | Stop reason: HOSPADM

## 2022-12-08 RX ADMIN — MIDAZOLAM HYDROCHLORIDE 0.5 MG: 1 INJECTION, SOLUTION INTRAMUSCULAR; INTRAVENOUS at 09:07

## 2022-12-08 RX ADMIN — MIDAZOLAM HYDROCHLORIDE 1 MG: 1 INJECTION, SOLUTION INTRAMUSCULAR; INTRAVENOUS at 08:25

## 2022-12-08 RX ADMIN — FENTANYL CITRATE 25 MCG: 50 INJECTION, SOLUTION INTRAMUSCULAR; INTRAVENOUS at 09:02

## 2022-12-08 RX ADMIN — ONDANSETRON 4 MG: 2 INJECTION INTRAMUSCULAR; INTRAVENOUS at 07:52

## 2022-12-08 RX ADMIN — FENTANYL CITRATE 25 MCG: 50 INJECTION, SOLUTION INTRAMUSCULAR; INTRAVENOUS at 09:09

## 2022-12-08 RX ADMIN — FENTANYL CITRATE 50 MCG: 50 INJECTION, SOLUTION INTRAMUSCULAR; INTRAVENOUS at 08:27

## 2022-12-08 RX ADMIN — ACETAMINOPHEN 975 MG: 325 TABLET, FILM COATED ORAL at 10:45

## 2022-12-08 RX ADMIN — LIDOCAINE HYDROCHLORIDE 10 ML: 10 INJECTION, SOLUTION INFILTRATION; PERINEURAL at 08:40

## 2022-12-08 RX ADMIN — FENTANYL CITRATE 50 MCG: 50 INJECTION, SOLUTION INTRAMUSCULAR; INTRAVENOUS at 09:20

## 2022-12-08 RX ADMIN — MIDAZOLAM HYDROCHLORIDE 0.5 MG: 1 INJECTION, SOLUTION INTRAMUSCULAR; INTRAVENOUS at 08:59

## 2022-12-08 RX ADMIN — SCOPALAMINE 1 PATCH: 1 PATCH, EXTENDED RELEASE TRANSDERMAL at 07:52

## 2022-12-08 RX ADMIN — SODIUM CHLORIDE: 9 INJECTION, SOLUTION INTRAVENOUS at 08:00

## 2022-12-08 RX ADMIN — IODIXANOL 30 ML: 320 INJECTION, SOLUTION INTRAVASCULAR at 09:35

## 2022-12-08 RX ADMIN — MIDAZOLAM HYDROCHLORIDE 1 MG: 1 INJECTION, SOLUTION INTRAMUSCULAR; INTRAVENOUS at 09:22

## 2022-12-08 RX ADMIN — Medication 81 MG: at 10:26

## 2022-12-08 RX ADMIN — FENTANYL CITRATE 50 MCG: 50 INJECTION, SOLUTION INTRAMUSCULAR; INTRAVENOUS at 08:32

## 2022-12-08 RX ADMIN — MIDAZOLAM HYDROCHLORIDE 1 MG: 1 INJECTION, SOLUTION INTRAMUSCULAR; INTRAVENOUS at 09:17

## 2022-12-08 RX ADMIN — MIDAZOLAM HYDROCHLORIDE 1 MG: 1 INJECTION, SOLUTION INTRAMUSCULAR; INTRAVENOUS at 08:30

## 2022-12-08 RX ADMIN — FENTANYL CITRATE 50 MCG: 50 INJECTION, SOLUTION INTRAMUSCULAR; INTRAVENOUS at 09:15

## 2022-12-08 NOTE — PRE-PROCEDURE
GENERAL PRE-PROCEDURE:   Procedure:  LLE venogram  Date/Time:  12/8/2022 8:13 AM    Written consent obtained?: Yes    Risks and benefits: Risks, benefits and alternatives were discussed    Consent given by:  Patient  Patient states understanding of procedure being performed: Yes    Patient's understanding of procedure matches consent: Yes    Procedure consent matches procedure scheduled: Yes    Expected level of sedation:  Moderate  Appropriately NPO:  Yes  ASA Class:  2  Mallampati  :  Grade 1- soft palate, uvula, tonsillar pillars, and posterior pharyngeal wall visible  Lungs:  Lungs clear with good breath sounds bilaterally  Heart:  Normal heart sounds and rate  History & Physical reviewed:  History and physical reviewed and no updates needed  Statement of review:  I have reviewed the lab findings, diagnostic data, medications, and the plan for sedation

## 2022-12-08 NOTE — IP AVS SNAPSHOT
[facility] Interventional Radiology  [address] [place] [id number]  Phone: [phone]  Fax: [phone]                                    After Visit Summary   12/8/2022    Paul Loera   MRN: [id number]           After Visit Summary Signature Page    I have received my discharge instructions, and my questions have been answered. I have discussed any challenges I see with this plan with the nurse or doctor.    ..........................................................................................................................................  Patient/Patient Representative Signature      ..........................................................................................................................................  Patient Representative Print Name and Relationship to Patient    ..................................................               ................................................  Date                                   Time    ..........................................................................................................................................  Reviewed by Signature/Title    ...................................................              ..............................................  Date                                               Time          22EPIC Rev 08/18

## 2022-12-08 NOTE — LETTER
December 8, 2022      Paul Loera  [address]   [place] [address]        To Whom It May Concern:    Paul Loera had a procedure on 12/08/2022.  Please allow her to perform light duty activities on Friday, December 9, 2022. She may return to normal activities starting Saturday, December 10, 2022. Please contact us with further questions.      Sincerely,        HOANG GR CNP

## 2022-12-08 NOTE — PROCEDURES
[facility]    Procedure: Imaging Procedure Note    Date/Time: 12/8/2022 9:56 AM  Performed by: Aixa Guerrero MD  Authorized by: Aixa Guerrero MD       UNIVERSAL PROTOCOL   Site Marked: Yes  Prior Images Obtained and Reviewed:  Yes  Required items: Required blood products, implants, devices and special equipment available    Patient identity confirmed:  Verbally with patient  Patient was reevaluated immediately before administering moderate or deep sedation or anesthesia  Confirmation Checklist:  Patient's identity using two indicators, relevant allergies, procedure was appropriate and matched the consent or emergent situation and correct equipment/implants were available  Time out: Immediately prior to the procedure a time out was called    Universal Protocol: the Joint Commission Universal Protocol was followed    Preparation: Patient was prepped and draped in usual sterile fashion      SEDATION  Patient Sedated: Yes    Vital signs: Vital signs monitored during sedation    See dictated procedure note for full details.    PROCEDURE  Describe Procedure: Pelvic venogram  Patient Tolerance:  Patient tolerated the procedure well with no immediate complications  Length of time physician/provider present for 1:1 monitoring during sedation: 60

## 2022-12-12 ENCOUNTER — TELEPHONE (OUTPATIENT)
Dept: INTERVENTIONAL RADIOLOGY/VASCULAR | Facility: CLINIC | Age: 27
End: 2022-12-12

## 2022-12-12 NOTE — TELEPHONE ENCOUNTER
Pt. Calls for work note extending through the weekend. [facility] phone number [phone] provided. Pt. States had L groin, low back, bilat hip and LLE discomfort over the weekend. Pt. Has IR nurse intake number.

## 2022-12-27 ENCOUNTER — OFFICE VISIT (OUTPATIENT)
Dept: VASCULAR SURGERY | Facility: CLINIC | Age: 27
End: 2022-12-27
Attending: RADIOLOGY
Payer: COMMERCIAL

## 2022-12-27 VITALS — DIASTOLIC BLOOD PRESSURE: 60 MMHG | HEART RATE: 70 BPM | RESPIRATION RATE: 16 BRPM | SYSTOLIC BLOOD PRESSURE: 110 MMHG

## 2022-12-27 DIAGNOSIS — M79.605 PAIN OF LEFT LOWER EXTREMITY: Primary | ICD-10-CM

## 2022-12-27 NOTE — NURSING NOTE
[facility]        Patient is here for a 2 week follow up  to discuss post venogram. Pt states that her symptoms have not improved. She still has L leg swelling and fatigue    Pt is currently taking Aspirin.    /60   Pulse 70   Resp 16     The provider has been notified that the patient has no concerns.     Questions patient would like addressed today are: N/A.    Refills are needed: No    Has homecare services and agency name:  Rocio Holman RN

## 2022-12-27 NOTE — LETTER
[facility] [address] [place] [address]  Phone: [phone]  Fax: [phone]      December 27, 2022    Paul Loera  [address] [place] [id number]    TO WHOM IT MAY CONCERN,    Paul Loera was seen on Dec 27, 2022.  Please excuse her from work, starting December 27, 2022 until 12/28/22 due to Appointment.    Sincerely,      Dr. Guerrero    [facility]

## 2022-12-27 NOTE — PATIENT INSTRUCTIONS
Referral will be sent to [place]. Dr. Guerrero will send referral in the next 1-2 weeks to assure they have all the needed information.

## 2022-12-28 NOTE — PROGRESS NOTES
VASCULAR AND INTERVENTIONAL OUTPATIENT CONSULT OR VISIT  PHYSICIAN: Aixa Guerrero MD  ESTABLISHED PATIENT    LOCATION: [facility]    Paul Loera   Medical Record #:  [id number]  YOB: 1995  Age:  27 year old     Date of Service: 12/27/2022    Reason for visit:  Follow up left lower extremity swelling     IMPRESSION: 27-year-old female with intermittent left lower extremity swelling of unclear etiology.  The patient recently underwent pelvic venography and intravascular ultrasound imaging which demonstrated compression of the left common iliac vein between the spine and right common iliac artery.  Given IVUS findings suspicious for a nonthrombotic iliac vein lesion, the left common iliac vein was successfully stented.  Unfortunately, the patient reports no significant change in her left leg swelling post stenting.    The patient presents today to the clinic and her right calf circumference is 38 cm and the left calf circumference is 41 cm.  Overall, unclear etiology of isolated left leg swelling which occurs especially with driving and sitting.  The patient does not experience the symptoms on her days off from work.  She denies any right lower extremity symptoms.  There is no deep vein thrombosis or venous insufficiency in the left lower extremity.  No arterialization of venous waveforms in the left leg to suggest an arteriovenous malformation.  The patient reports minimal improvement with compression therapy.    RECOMMENDATION:   Unclear etiology of ipsilateral, episodic, left lower extremity swelling especially with sitting/driving and relieved with leg elevation. We will plan for a referral to [facility].       HPI:  Paul Loera is a 27 year old female who was evaluated today for for left lower extremity swelling.  The patient reports her symptoms have been present for the past few years and have been gradually progressive.  The patient reports gradual, progressive swelling  of her left lower extremity throughout the day which initially begins in the calf and then spreads to the thigh.  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  The patient reports relief of the lower extremity swelling upon elevating her left lower extremity for 30-40 minutes.  The patient denies any significant injury to the left lower extremity or history of venous thromboembolism.  No lower extremity wounds or ulcerations.  The patient recently underwent pelvic venography and stenting of her left common iliac vein.  She reports no significant change in her symptoms post stenting.    PHH:    Past Medical History:   Diagnosis Date     Anemia      Ankle impingement syndrome, left      Central loss of vision, left      Giant cell tumor of tendon sheath      PONV (postoperative nausea and vomiting)      Tenosynovitis     left achilles     Uncomplicated asthma         Past Surgical History:   Procedure Laterality Date     ARTHROSCOPY HIP, CORRECT IMPINGEMENT FEMOROACETABULAR, REPAIR TEAR LABRAL, COMBINED Left 01/01/2010     EXCISE MASS UPPER EXTREMITY Left 1/27/2022    Procedure: Excision left wrist masses;  Surgeon: Pankaj Guevara MD;  Location: [facility] OR     EXCISE MASS WRIST Left 10/27/2016    Procedure: EXCISE MASS WRIST;  Surgeon: Pankaj Guevara MD;  Location:  OR     EXCISE MASS WRIST Left 09/27/2018    Procedure: EXCISE MASS WRIST;  Excision Left Wrist Masses;  Surgeon: Pankaj Guevara MD;  Location:  OR     EYE SURGERY Left     X3 for retinal injury     GALLBLADDER SURGERY  01/01/2014     IR LOWER EXTREMITY VENOGRAM LEFT  12/8/2022     LENGTHEN TENDON ACHILLES Left 09/23/2015    Procedure: LENGTHEN TENDON ACHILLES;  Surgeon: Chandana Bunch MD;  Location:  OR     OPEN PARTIAL TALUS EXCISION Left 03/16/2016    MD Julio C     SHOULDER SURGERY Right 2011, 2012, 2013     [facility] PELVIS/HIP JOINT SURGERY UNLISTED       [facility] SHOULDER SURG PROC UNLISTED       [facility]  STOMACH SURGERY PROCEDURE UNLISTED         ALLERGIES:  Patient has no known allergies.    MEDS:    Current Outpatient Medications:      acetaminophen (TYLENOL) 325 MG tablet, Take 2 tablets (650 mg) by mouth every 4 hours as needed for other (mild pain), Disp: 60 tablet, Rfl: 1     Ascorbic Acid (VITAMIN C PO), Unknown dose. Takes 1 tablet qd, Disp: , Rfl:      aspirin (ASA) 81 MG chewable tablet, Take 1 tablet (81 mg) by mouth daily Start taking medication the day after the procedure, Disp: 90 tablet, Rfl: 3     B Complex-C-E-Zn (ZINC-VITES PO), , Disp: , Rfl:      CEPHALEXIN PO, Take 500 mg by mouth 2 times daily, Disp: , Rfl:      Ferrous Gluconate 240 (27 Fe) MG TABS, Take 240 mg by mouth every evening, Disp: , Rfl:      ibuprofen (ADVIL/MOTRIN) 600 MG tablet, Take 1 tablet (600 mg) by mouth every 6 hours as needed for pain (mild), Disp: 40 tablet, Rfl: 0     oxyCODONE (ROXICODONE) 5 MG tablet, Take 1-2 tablets (5-10 mg) by mouth every 4 hours as needed for pain or moderate to severe pain (Moderate to Severe), Disp: 25 tablet, Rfl: 0     senna-docusate (SENOKOT-S;PERICOLACE) 8.6-50 MG per tablet, Take 1-2 tablets by mouth 2 times daily as needed for constipation Take while on oral narcotics to prevent or treat constipation., Disp: 30 tablet, Rfl: 1     UNABLE TO FIND, 1 tablet daily MEDICATION NAME:zinc, unknown dose, Disp: , Rfl:     SOCIAL HABITS:    History   Smoking Status     Never   Smokeless Tobacco     Never     Social History    Substance and Sexual Activity      Alcohol use: Yes        Comment: occasional       History   Drug Use     Types: Marijuana     Comment: occ       FAMILY HISTORY:    Family History   Problem Relation Age of Onset     Other Cancer Paternal Grandmother        ADVANCE CARE DIRECTIVES:    Advance care directives reviewed in the chart and no changes made.     PE:  /60   Pulse 70   Resp 16   Wt Readings from Last 1 Encounters:   12/08/22 160 lb (72.6 kg)     There is no  height or weight on file to calculate BMI.    EXAM:  GENERAL: This is a well-developed 27 year old female who appears her stated age  EYES: Grossly normal.  MOUTH: Buccal mucosa normal   MUSCULOSKELETAL: Grossly normal and both lower extremities are intact.  HEME/LYMPH: No lymphedema  NEUROLOGIC: Focally intact, Alert and oriented x 3.   PSYCH: appropriate affect  INTEGUMENT: No open lesions or ulcers  Right calf diameter: 38 cm  Left calf diameter: 41 cm    DIAGNOSTIC STUDIES:     Images:  IR Lower Extremity Venogram Left    Result Date: 12/8/2022  LOCATION: [facility] DATE: 12/8/2022 PROCEDURE: PELVIC DIAGNOSTIC VENOGRAPHY, INTRAVASCULAR ULTRASOUND IMAGING OF THE INFERIOR VENA CAVA/LEFT COMMON ILIAC VEIN/LEFT EXTERNAL ILIAC VEIN, STENT PLACEMENT AND ANGIOPLASTY IN THE LEFT COMMON ILIAC VEIN, HEMODYNAMIC PRESSURE MEASUREMENTS IN THE INFERIOR VENA CAVA AND LEFT COMMON ILIAC VEIN, ULTRASOUND GUIDANCE FOR VASCULAR ACCESS. INTERVENTIONAL RADIOLOGIST: Aixa Guerrero MD INDICATION: 27-year-old female with intermittent left lower extremity swelling which is worsened with exertion and relieved by elevation of the extremity. Ultrasound imaging demonstrates suspected web/compression of the left common iliac vein. Plan for pelvic venography for further evaluation. CONSENT: The risks, benefits and alternatives of the procedure were discussed with the patient  in detail. All questions were answered. Informed consent was given to proceed with the procedure. MODERATE SEDATION: Versed 6 mg IV and fentanyl 300 mcg IV were administered by the radiology nurse with continuous vital sign monitoring under my direct supervision. During the time out, immediately prior to the administration of medications, the patient  was reassessed for adequacy to receive conscious sedation. Total moderate sedation time was 60 minutes. CONTRAST: 40 cc Visipaque FLUOROSCOPIC TIME: 14.3 minutes. RADIATION DOSE: Air Kerma: 432 mGy.  COMPLICATIONS: No immediate complications. UNIVERSAL PROTOCOL: The operative site was marked and any prior imaging was reviewed. Required items including blood products, implants, devices and special equipment was made available. Patient identity was confirmed either verbally, with demographic information, hospital assigned identification or other identification markers. A timeout was performed immediately prior to the procedure. STERILE BARRIER TECHNIQUE: Maximum sterile barrier technique was used. Cutaneous antisepsis was performed at the operative site with application of 2% chlorhexidine and large sterile drape. Prior to the procedure, the  and assistant performed hand hygiene and wore hat, mask, sterile gown, and sterile gloves during the entire procedure. PROCEDURE:  Access was achieved into the left common femoral vein utilizing real-time ultrasound guidance and micropuncture access kit. Imaging demonstrates a patent and compressible vessel. Permanent images were stored to the patient's medical record. Conversion was made for a 9 Thai vascular sheath, which was attached to a continuous heparinized saline infusion. A pelvic venogram was obtained through the sheath demonstrating patent left external/common iliac veins and inferior vena cava. There is a compression deformity overlying the cranial left common iliac vein. An angled catheter and guidewire were advanced into the inferior vena cava and a pressure measurement was obtained with mean pressure of 10 mmHg. The catheter was retracted to the caudal left common iliac vein with mean pressure measurement of 11 mmHg. Over-the-wire exchange is made for an 8 Thai [employer] intravascular ultrasound imaging probe. The probe was advanced to the inferior vena cava and pullback intervascular imaging was performed of the inferior vena cava and left common iliac and  external iliac veins. Imaging demonstrates focal, severe compression of the cranial  left common iliac vein between the spine and right common iliac artery, suggesting either anatomy. Vessel measurements of the left common iliac vein were obtained. The intravascular ultrasound imaging catheter was used to win the IVC confluence area of narrowing in the left common iliac vein. Over-the-wire exchange is made for an 18 mm x 60 mm Abre venous stent which was carefully positioned and centered over the area of narrowing in the left common iliac vein. Post stent deployment angioplasty was performed utilizing 18 mm x 4 cm high pressure balloon. Post stent deployment venography demonstrates excellent result with no significant residual stenosis both on venography and intravascular ultrasound imaging. The sheath was removed and manual pressure applied until hemostasis.     IMPRESSION:  1. 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 Successful placement of an 18 mm x 6 cm venous stent centered in the area of narrowing. Post intervention imaging demonstrates resolution of venous narrowing. PLAN: Aspirin 81 mg once daily x6 months. Plan for short interval clinic follow-up to evaluate response.      LABS:      Sodium   Date Value Ref Range Status   12/29/2016 143 133 - 144 mmol/L Final   10/25/2016 142 133 - 144 mmol/L Final     Urea Nitrogen   Date Value Ref Range Status   12/29/2016 9 7 - 30 mg/dL Final   10/25/2016 12 7 - 30 mg/dL Final     Hemoglobin   Date Value Ref Range Status   12/08/2022 8.3 (L) 11.7 - 15.7 g/dL Final   10/25/2016 11.1 (L) 11.7 - 15.7 g/dL Final   02/11/2016 11.3 (L) 11.7 - 15.7 g/dL Final   09/03/2015 11.0 (L) 11.7 - 15.7 g/dL Final     Platelet Count   Date Value Ref Range Status   12/08/2022 295 150 - 450 10e3/uL Final   10/25/2016 247 150 - 450 10e9/L Final   02/11/2016 211 150 - 450 10e9/L Final   09/03/2015 188 150 - 450 10e9/L  Final     INR   Date Value Ref Range Status   12/08/2022 1.08 0.85 - 1.15 Final       This was a in person visit in which 45 minutes of  total time was spent (either in face-to-face or non-face-to-face time).    Aixa Guerrero MD, [facility]  VASCULAR AND INTERVENTIONAL PHYSICIAN  VASCULAR MEDICINE  INTERNAL MEDICINE  PAGER: [phone]  CALL SERVICE: [phone]

## 2023-01-18 NOTE — PATIENT INSTRUCTIONS
Paul Loera,    Your visit to [facility] for your procedure is coming soon and we look forward to seeing you! This friendly reminder and pre-procedure checklist will help to ensure your procedure goes smoothly and meets your expectations. At [facility], our goal is to provide you with a great patient experience and to deliver genuine, professional care to every patient.     Please complete all the steps in advance of your visit. If you have any questions about the items listed below, please give our office a call. We can be reached at [phone] or visit our website at [email or web address] for more information.     Procedure: left leg venogram    Procedure Date :  12/1/22    Procedure Time :  1000    Arrival Time: 0900    Admission Type: Outpatient    Procedure Location: [facility]:  [address] [place] [address] (phone: [phone], Fax: [phone])    Surgeon: MD Aixa Guerrero    COVID PCR Test: 11/29/22    3 week Post Procedure Appointment with Dr Samuel Guerrero 12/29/22        PLEASE DO NOT STOP YOUR ASPIRIN OR PLAVIX UNLESS SPECIFICALLY DIRECTED BY THE VASCULAR SURGEON TO STOP!  In most cases Vascular surgeons want you to continue these. This is different from most NON vascular surgeries and may not be well known by your Primary Care Provider    If you take blood thinners: SEE SPECIFIC INSTRUCTIONS BELOW      In most cases Vascular providers want you to continue these. This is different from most NON vascular surgeries and may not be well known by your Primary Care Provider        Prepare for the peripheral angiography as follows:  Do not eat 8 hours before your procedure. You may have clear liquids up to 2 hours before surgery.  If your provider says to take your normal medicines, swallow them with only small sips of water.  Tell your healthcare provider about all medicines you take and any  Called pt today (1/18/23) to f/u from our conversation on Friday  Left message on voicemail and requested a call back  allergies you may have.  Arrange for a family member or friend to drive you home.  If you are on insulin, please take only 1/2 the dose the night before and HOLD the day of the procedure.   If you are on Metformin, please HOLD for 48 hours after the procedure.     Peripheral Angiography    Peripheral angiography is an outpatient procedure that makes a  map  of the vessels (arteries) in your lower body, legs, and arms, using X-ray and dye.This map can show where blood flow may be blocked.    An angiogram is commonly performed under sedation with the use of local anesthesia.    The procedure usually starts with a needle put into the femoral (groin) artery. From one treatment site, areas all over the body can be treated.  After access is established, catheters (thin tubes) and wires are threaded through the arterial system to a specific area of interest or throughout the entire body.  As a contrast agent (iodine dye) is injected, X-ray images are taken to let your provider view the flow of the dye and identify blockages. The surgeon can then choose the best mode of therapy for you - whether during or following the angiogram. This decision depends on your symptoms and the severity and characteristics of the blockages.  Two common therapies that can be provided during the angiogram are balloon angioplasty and stent placement.                   Angioplasty can be used to open arterial blockages. Guided by X-ray, your provider navigates through the blockage with a wire and introduces a special device equipped with an inflatable balloon. After positioning the balloon device across the blocked portion of the artery, the provider inflates the balloon to expand the artery and compress the blockage. The balloon is then deflated and removed while keeping the wire in place across the area that has been treated. Next, contrast dye is injected to assess the result. Treatment is considered a success if blood flow is improved and  less than 30% of the blockage remains. If the vessel is still considerably narrowed, placing a stent may be the next step.    Stents are used to prop open an artery at the site of a narrowing. Stents are generally placed after balloon angioplasty when there is residual narrowing or insufficient blood flow in a treated vessel. Stents are considered a permanent implant and cannot be used if you have a metal allergy. Stents that are used in the leg are constructed of a nickel-titanium alloy (Nitinol), a memory-shaped metal. This alloy has a predetermined size and shape at body temperature and expands to this size and shape after being introduced through a catheter. These stents resist kinking and are flexible so that damage from activities that involve your legs is minimized.  Your procedure may require other techniques to address the problem or plaque.     If surgery is felt to be a better option, your vascular provider will obtain any additional X-ray images needed to plan a surgical bypass of the blocked vessel/s and will then conclude the angiogram.      During the procedure  Here is what to expect:  You may get medicine through an IV (intravenous) line to relax you. You re given an injection to numb the insertion site. Then, a tiny skin cut (incision) is made near an artery in your groin.  Your provider inserts a thin tube (catheter) through the incision. He or she then threads the catheter into an artery while looking at a video monitor.  Contrast  dye  is injected into the catheter to confirm position. You may feel warmth or pressure in your legs and back. You lie still as X-rays are taken. The catheter is then taken out.  After the procedure  You ll be taken to a recovery area. A healthcare provider will apply pressure to the site for about 10 minutes. Your healthcare provider will tell you how long to lie down and keep the insertion site still. Your healthcare provider will discuss the results with you soon  after the procedure.      Angiogram Procedure Discharge Instructions:     1. If you received sedation for your procedure: Do not drive or operate heavy machinery for the rest of the day.  2. Avoid strenuous activity for 72 hours (3 days):                        - Do not lift greater than 10 pounds.                        - Excessive exercise                        - Straining                        - Return to your normal activities as you tolerate after the 3 days restriction  3. Avoid tub baths, Jacuzzis, hot tubs and pools for 72 hours (3 days) or until puncture site is will healed.  4. You may shower beginning tomorrow. Do not scrub puncture site(s) until well healed, pat dry.  5. You can expect to return to work 1-2 days after your procedure - depending on the nature of your profession.  6. It is normal to have some tenderness and minimal swelling at puncture site. A small area of discoloration may be present. Tenderness typically subsides in 24-48 hours. A small knot may also be present at puncture site for 6-8 weeks, this can be a normal part of the healing process.       After the angiogram If you:      1. Experience any bleeding or active swelling from puncture site: Lie down, firmly apply pressure to puncture site and CALL 9-1-1  2. Fever greater than 101 degrees Fahrenheit.  3. Redness, swelling, warmth to touch, or purulent (yellow/green/foul smelling) drainage from the puncture site.  4. Increasing pain, tenderness or swelling at puncture site OR of arm/leg near puncture site.  5. Feeling weak or faint.  6. Change in color, temperature, or sensation of arm/leg where puncture was made.  7. You can t feel your thrill (pulse at your dialysis fistula site) or it feels weak (If you had fistulogram done).     Call us with any other questions or concerns after your procedure: [phone]      All invasive procedures can have complications. While the risk of an angiogram is low it is not zero. The most common  "complications are related to the arterial access site.       Risks/ Complications   Bruising is Common  You will likely have bruising (ecchymosis) where the artery was entered.    Pain and Bleeding  Less commonly, patients experience pain and bleeding that may include blood collecting under the skin (hematoma).    Blockage and Leakage   In rare cases, the access artery can become blocked. Infrequently, patients experience persistent leakage of blood where the artery was entered, which can result in the formation of a pseudoaneurysm--a blood-filled sac--that may require further treatment.  Other complications related to an angiogram include:   Allergic reaction to the iodine contrast dye, which can lead to the development of kidney failure.  Very rarely during balloon angioplasty and/or stent placement, part of the arterial blockage can break off (embolism) and travel to more distant arteries. This can worsen blood flow.        Notify our office right away, if you have any changes in your health status, or if you develop a cold, flu, diarrhea, infection, fever or sore throat before your scheduled surgery date. We can be reached at [phone] Monday-Friday 8 am-4:30 pm if you have any questions.   Thank you for choosing [facility]      [] PCR COVID-19 test is required within 4 days of surgery  If your test is positive or you fail to get tested, your surgery will be postponed.     [] Contact your insurance regarding coverage  If you would like a Good Jade Estimate for your upcoming procedure at [facility], contact Cost of Care Estimates   Advocates are available Monday through Friday 8am - 5pm   [phone]  You may also submit a request online at [email or web address]  - Complete the secure online form found under \"Services and Procedure Pricing\"   If your procedure is at [facility] please contact the numbers below for Cost of Care Estimates.   - Facility " Charge: [phone]    Anesthesiology charge:  [phone]      [] DO NOT BRING FMLA WITH TO SURGERY.  These should be sent to the provider's office by fax to [phone].     [] Day of Surgery  Medications - Take as indicated with sips of water.   Wear comfortable loose fitting clothes. Wear your glasses-Not contacts. Do not wear jewelry and remove body piercing's. Surgery may be cancelled if they are not removed.   You may have 1 family member wait in the lobby during your surgery. Visitor restrictions are subject to change. Please verify with the surgery nurse when they call.   If same day surgery-Have a someone come with you to surgery that can help you understand the surgeon's instructions, drive you home and stay with you overnight the first night.    [] If the community sees a new COVID-19 surge, your procedure may need to be postponed. We will contact you if this happens.    [] You will receive a call from a surgery nurse 1-3 days prior to surgery. They will go over more details with you.       Before Your Procedure or Hospital Admission Testing for COVID-19  Thank you for choosing [facility] for your health care needs. The COVID-19 pandemic is a very challenging time for everyone.   Our goal is to keep you and our team here at [facility] safe and healthy. We ve taken many steps to make this happen.   For example:   We test and screen our staff, care teams and patients for COVID-19.   Everyone at [facility] must wear a mask and stay 6 feet apart.   We are limiting hospital and clinic visitors.  Before you come in  You must get tested for COVID-19, even if you ve been vaccinated.   If you re going home on the same day as your procedure (surgery):  1 to 2 days before your procedure, take an at-home, rapid antigen test. You can buy these at many pharmacy stores. Or you can order free, at-home tests at covid.gov/tests. If you   can t find an at-home, rapid antigen test, please schedule  a PCR test with  [facility] by calling [phone], or visiting [email or web address]. We   can t accept tests that are more than 4 days old.   If your test is negative, please take a photo of the test. Show the photo to the nurse when you come in for your procedure.  If your test is positive, please see the  If your test shows you have COVID-19  section on this page.    If you re staying overnight in the hospital:  4 days before your procedure, please get a PCR test from a lab.   To schedule a PCR test with  [facility], call [phone]. Or, visit [email or web address].    If your test is negative, please ask the testing location to fax your results to us at [phone].  If your test is positive, please see the  If your test shows you have COVID-19  section below.    After your test and before your procedure, please follow these safety guidelines:   Limit trips outside your home.   Limit the number of people you see.   Always wear a face covering outside your home.   Use social distancing. Stay 6 feet away from others whenever you can.   Wash your hands often.    If your test shows you have COVID-19  If your test is positive, please tell your surgeon s office right away. A positive test means that you have COVID-19.  We ll probably have to postpone your admission, surgery or procedure. Your care team will discuss this with you. After that, we ll let you know what to   do and when you can re-schedule.      If your test shows you DON T have COVID-19.   Even if your test is negative, you can still get COVID-19. It s rare, but sometimes the test result is wrong. You could also catch the virus after taking   the test. There s a very small chance that you could catch COVID-19 in the hospital or surgery center. [facility] has taken many steps to prevent this from happening.   Possible surgery delay like you, we want your surgery to happen when it s scheduled. But  sometimes the hospital is so full that it s not safe for you to have your surgery. This is   especially true during the pandemic. Your surgery may need to be re-scheduled at a later date. If this happens, we will call and tell you.   Day of your surgery or procedure   Please come wearing a face covering that covers both your nose and mouth.   When you arrive, we ll ask you some questions to find out if you ve had any exposures to COVID-19, or have any signs of COVID-19.   No matter where you took a test, we ll need to have the results. You can ask your testing location to fax the results to us at [phone]. If you   took a rapid antigen at-home test, you can bring a photo of the results.    Ask your care team if you can have visitors. All visitors must wear face coverings and will be screened for exposure to, or signs of, COVID-19.    The rules for visitors change often, depending on how much the virus is spreading. To learn more, see Visiting a Loved One in the Hospital during   the COVID-19 Outbreak.Please call your care team, hospital or surgery center if you have any questions. We thank you for your understanding and for choosing [facility]   for your care.   Questions and answers  Does it matter how I get tested for COVID-19?   Yes. If the plan is for you to go home on the same day as your procedure, you can take a rapid antigen, at-home test 1 to 2 days before you come in. If your test is negative, please take a photo of your test. Show it to the nurse when you come to the hospital. If you can t find a rapid antigen, at-home test, you can take a PCR test at a lab instead. If the plan is for you to stay in the hospital after your surgery, you ll need to get a PCR test from a lab 4 days before your procedure. Ask the testing location to fax your results to [phone]. If we don t get your results, we may have to delay or cancel your treatment.  Do I need to get tested at [facility]?  No.  However, if you need a PCR test from a lab, we recommend using an [facility] lab. We ll get the results more quickly and easily that way. To schedule a test, please call [phone]. Or, visit [email or web address]      For informational purposes only. Not to replace the advice of your health care provider. Copyright   2020 [facility]. All rights reserved. Clinically reviewed by Infection Prevention and the [facility] COVID-19 Clinical Team.   [employer] 841212 - Rev 05/26/22.

## 2023-06-03 ENCOUNTER — HEALTH MAINTENANCE LETTER (OUTPATIENT)
Age: 28
End: 2023-06-03

## 2024-03-19 ENCOUNTER — TELEPHONE (OUTPATIENT)
Dept: VASCULAR SURGERY | Facility: CLINIC | Age: 29
End: 2024-03-19
Payer: COMMERCIAL

## 2024-03-19 NOTE — TELEPHONE ENCOUNTER
Caller: Paul    Provider: MD Aixa Guerrero    Detailed reason for call: Paul is calling stating that [employer] is requesting he schedule a follow up with us. He states his leg is swelling again and is wondering if there's an issue with the stent? He states workers comp will also be sending over a referral for us to review    Best phone number to contact: [phone]    Best time to contact: any    Ok to leave a detailed message: No    Ok to speak to authorized person if needed: No      (Noted to patient if reason is related to wound or incision, to please send a photo via email or [facility].)

## 2024-03-19 NOTE — TELEPHONE ENCOUNTER
Writer discussed with pt, she is currently being seen by [facility] for physical therapy for right foot pain. She stated that PT is concerned that it could be due to her left leg stent. She has not been seen by [facility] vascular surgery.     Writer spoke with Dr. Guerrero, he does not believe her symptoms are due to her veins. He will send referral to [facility] again but wants her to be seen by them to manage and assist with workers comp paperwork.     He will order left venous ultrasound and iliac ultrasound to rule out if patient would like. Left message to discuss with patient.

## 2024-03-20 NOTE — TELEPHONE ENCOUNTER
Called and spoke with Patient. Pt is going to call clinic back with an update on what they would like to proceed with. They would like to contact workers comp first. Pt unsure if they want to do US or go to [place].

## 2024-07-07 ENCOUNTER — HEALTH MAINTENANCE LETTER (OUTPATIENT)
Age: 29
End: 2024-07-07

## 2025-07-19 ENCOUNTER — HEALTH MAINTENANCE LETTER (OUTPATIENT)
Age: 30
End: 2025-07-19

## (undated) DEVICE — SUCTION MANIFOLD NEPTUNE 2 SYS 1 PORT 702-025-000

## (undated) DEVICE — PREP POVIDONE-IODINE 7.5% SCRUB 4OZ BOTTLE MDS093945

## (undated) DEVICE — PREP DURAPREP REMOVER 4OZ 8611

## (undated) DEVICE — GLOVE PROTEXIS POWDER FREE SMT 7.0  2D72PT70X

## (undated) DEVICE — DRSG AQUACEL AG HYDROFIBER 3.5X6" 422604

## (undated) DEVICE — LINEN ORTHO PACK 5446

## (undated) DEVICE — CAST PADDING 4" STERILE 9044S

## (undated) DEVICE — ESU ELEC BLADE HEX-LOCKING 2.5" E1450X

## (undated) DEVICE — SOL NACL 0.9% IRRIG 1000ML BOTTLE 2F7124

## (undated) DEVICE — GLOVE PROTEXIS BLUE W/NEU-THERA 7.5  2D73EB75

## (undated) DEVICE — DRSG STERI STRIP 1/2X4" R1547

## (undated) DEVICE — PACK UNIVERSAL SPLIT 29131

## (undated) DEVICE — GLOVE PROTEXIS W/NEU-THERA 7.0  2D73TE70

## (undated) DEVICE — PREP DURAPREP 26ML APL 8630

## (undated) DEVICE — SU SILK 2-0 PS 18" 1588H

## (undated) DEVICE — SU VICRYL 2-0 SH 27" UND J417H

## (undated) DEVICE — SPECIMEN CONTAINER 5OZ STERILE 2600SA

## (undated) DEVICE — DRAPE STERI TOWEL LG 1010

## (undated) DEVICE — ESU GROUND PAD ADULT W/CORD E7507

## (undated) DEVICE — PACK HAND CUSTOM ASC

## (undated) DEVICE — PREP POVIDONE IODINE SCRUB 7.5% 120ML

## (undated) DEVICE — DRSG AQUACEL AG 3.5X6.0" HYDROFIBER 412010

## (undated) DEVICE — SLING ARM LG 79-99157

## (undated) DEVICE — SU VICRYL 3-0 PS-1 18" UND J683G

## (undated) DEVICE — SOL NACL 0.9% IRRIG 500ML BOTTLE 2F7123

## (undated) DEVICE — PREP SKIN SCRUB TRAY 4461A

## (undated) DEVICE — ESU PENCIL SMOKE EVAC W/ROCKER SWITCH 0703-047-000

## (undated) RX ORDER — KETOROLAC TROMETHAMINE 30 MG/ML
INJECTION, SOLUTION INTRAMUSCULAR; INTRAVENOUS
Status: DISPENSED
Start: 2018-09-27

## (undated) RX ORDER — LIDOCAINE HYDROCHLORIDE 20 MG/ML
INJECTION, SOLUTION EPIDURAL; INFILTRATION; INTRACAUDAL; PERINEURAL
Status: DISPENSED
Start: 2022-01-27

## (undated) RX ORDER — OXYCODONE HYDROCHLORIDE 5 MG/1
TABLET ORAL
Status: DISPENSED
Start: 2018-09-27

## (undated) RX ORDER — ACETAMINOPHEN 325 MG/1
TABLET ORAL
Status: DISPENSED
Start: 2022-01-27

## (undated) RX ORDER — GABAPENTIN 300 MG/1
CAPSULE ORAL
Status: DISPENSED
Start: 2022-01-27

## (undated) RX ORDER — DEXAMETHASONE SODIUM PHOSPHATE 4 MG/ML
INJECTION, SOLUTION INTRA-ARTICULAR; INTRALESIONAL; INTRAMUSCULAR; INTRAVENOUS; SOFT TISSUE
Status: DISPENSED
Start: 2022-01-27

## (undated) RX ORDER — LIDOCAINE HYDROCHLORIDE 10 MG/ML
INJECTION, SOLUTION INFILTRATION; PERINEURAL
Status: DISPENSED
Start: 2022-12-08

## (undated) RX ORDER — ONDANSETRON 2 MG/ML
INJECTION INTRAMUSCULAR; INTRAVENOUS
Status: DISPENSED
Start: 2018-09-27

## (undated) RX ORDER — OXYCODONE HYDROCHLORIDE 5 MG/1
TABLET ORAL
Status: DISPENSED
Start: 2022-01-27

## (undated) RX ORDER — GABAPENTIN 300 MG/1
CAPSULE ORAL
Status: DISPENSED
Start: 2018-09-27

## (undated) RX ORDER — HEPARIN SODIUM 1000 [USP'U]/ML
INJECTION, SOLUTION INTRAVENOUS; SUBCUTANEOUS
Status: DISPENSED
Start: 2022-12-08

## (undated) RX ORDER — CEFAZOLIN SODIUM 2 G/50ML
SOLUTION INTRAVENOUS
Status: DISPENSED
Start: 2022-01-27

## (undated) RX ORDER — FENTANYL CITRATE 50 UG/ML
INJECTION, SOLUTION INTRAMUSCULAR; INTRAVENOUS
Status: DISPENSED
Start: 2022-01-27

## (undated) RX ORDER — DEXAMETHASONE SODIUM PHOSPHATE 4 MG/ML
INJECTION, SOLUTION INTRA-ARTICULAR; INTRALESIONAL; INTRAMUSCULAR; INTRAVENOUS; SOFT TISSUE
Status: DISPENSED
Start: 2018-09-27

## (undated) RX ORDER — SCOLOPAMINE TRANSDERMAL SYSTEM 1 MG/1
PATCH, EXTENDED RELEASE TRANSDERMAL
Status: DISPENSED
Start: 2022-12-08

## (undated) RX ORDER — FENTANYL CITRATE 50 UG/ML
INJECTION, SOLUTION INTRAMUSCULAR; INTRAVENOUS
Status: DISPENSED
Start: 2018-09-27

## (undated) RX ORDER — SCOLOPAMINE TRANSDERMAL SYSTEM 1 MG/1
PATCH, EXTENDED RELEASE TRANSDERMAL
Status: DISPENSED
Start: 2022-01-27

## (undated) RX ORDER — GLYCOPYRROLATE 0.2 MG/ML
INJECTION INTRAMUSCULAR; INTRAVENOUS
Status: DISPENSED
Start: 2022-01-27

## (undated) RX ORDER — PROPOFOL 10 MG/ML
INJECTION, EMULSION INTRAVENOUS
Status: DISPENSED
Start: 2022-01-27

## (undated) RX ORDER — ACETAMINOPHEN 325 MG/1
TABLET ORAL
Status: DISPENSED
Start: 2018-09-27

## (undated) RX ORDER — ONDANSETRON 2 MG/ML
INJECTION INTRAMUSCULAR; INTRAVENOUS
Status: DISPENSED
Start: 2022-12-08

## (undated) RX ORDER — FENTANYL CITRATE 50 UG/ML
INJECTION, SOLUTION INTRAMUSCULAR; INTRAVENOUS
Status: DISPENSED
Start: 2022-12-08

## (undated) RX ORDER — ONDANSETRON 2 MG/ML
INJECTION INTRAMUSCULAR; INTRAVENOUS
Status: DISPENSED
Start: 2022-01-27

## (undated) RX ORDER — ACETAMINOPHEN 325 MG/1
TABLET ORAL
Status: DISPENSED
Start: 2022-12-08